# Patient Record
Sex: MALE | Race: WHITE | NOT HISPANIC OR LATINO | Employment: FULL TIME | URBAN - METROPOLITAN AREA
[De-identification: names, ages, dates, MRNs, and addresses within clinical notes are randomized per-mention and may not be internally consistent; named-entity substitution may affect disease eponyms.]

---

## 2017-03-08 ENCOUNTER — OFFICE VISIT (OUTPATIENT)
Dept: URGENT CARE | Facility: PHYSICIAN GROUP | Age: 47
End: 2017-03-08
Payer: COMMERCIAL

## 2017-03-08 VITALS
OXYGEN SATURATION: 97 % | HEART RATE: 70 BPM | DIASTOLIC BLOOD PRESSURE: 88 MMHG | WEIGHT: 192.2 LBS | RESPIRATION RATE: 16 BRPM | TEMPERATURE: 98.2 F | SYSTOLIC BLOOD PRESSURE: 120 MMHG

## 2017-03-08 DIAGNOSIS — J01.40 ACUTE NON-RECURRENT PANSINUSITIS: ICD-10-CM

## 2017-03-08 PROCEDURE — 99203 OFFICE O/P NEW LOW 30 MIN: CPT | Performed by: FAMILY MEDICINE

## 2017-03-08 RX ORDER — FLUTICASONE PROPIONATE 50 MCG
1 SPRAY, SUSPENSION (ML) NASAL DAILY
Qty: 1 BOTTLE | Refills: 1 | Status: SHIPPED | OUTPATIENT
Start: 2017-03-08 | End: 2019-09-05

## 2017-03-08 RX ORDER — AMOXICILLIN AND CLAVULANATE POTASSIUM 875; 125 MG/1; MG/1
1 TABLET, FILM COATED ORAL 2 TIMES DAILY
Qty: 20 TAB | Refills: 0 | Status: SHIPPED | OUTPATIENT
Start: 2017-03-08 | End: 2017-03-18

## 2017-03-08 ASSESSMENT — ENCOUNTER SYMPTOMS
DOUBLE VISION: 0
SINUS PRESSURE: 1
VOMITING: 0
NAUSEA: 0
DIZZINESS: 0
COUGH: 1
SHORTNESS OF BREATH: 0
SORE THROAT: 0
FEVER: 0
NECK PAIN: 0
CHILLS: 1
BLURRED VISION: 0
HEADACHES: 1
HOARSE VOICE: 1

## 2017-03-08 NOTE — MR AVS SNAPSHOT
Darien Martinez   3/8/2017 10:15 AM   Office Visit   MRN: [id number]    Department:  [facility]   Dept Phone:  [phone]    Description:  Male : 1970   Provider:  Romeo Goodrich M.D.           Reason for Visit     Sinus Problem sinus pressure, head pressure, drainage, x 2 weeks      Allergies as of 3/8/2017     No Known Allergies      You were diagnosed with     Acute non-recurrent pansinusitis   [[id number]]         Vital Signs     Blood Pressure Pulse Temperature Respirations Weight Oxygen Saturation    120/88 mmHg 70 36.8 °C (98.2 °F) 16 87.181 kg (192 lb 3.2 oz) 97%    Smoking Status                   Never Smoker            Basic Information     Date Of Birth Sex Race Ethnicity Preferred Language    1970 Male White Non- English      Health Maintenance     Patient has no pending health maintenance at this time      Current Immunizations     No immunizations on file.      Below and/or attached are the medications your provider expects you to take. Review all of your home medications and newly ordered medications with your provider and/or pharmacist. Follow medication instructions as directed by your provider and/or pharmacist. Please keep your medication list with you and share with your provider. Update the information when medications are discontinued, doses are changed, or new medications (including over-the-counter products) are added; and carry medication information at all times in the event of emergency situations     Allergies:  No Known Allergies          Medications  Valid as of: 2017 - 10:52 AM    Generic Name Brand Name Tablet Size Instructions for use    Amoxicillin-Pot Clavulanate (Tab) AUGMENTIN 875-125 MG Take 1 Tab by mouth 2 times a day for 10 days.        Fluticasone Propionate (Suspension) FLONASE 50 MCG/ACT Spray 1 Spray in nose every day.        .                 Medicines prescribed today were sent to:     [id number] - [address] [address]  - [address] [facility].    [address] [place] [address]    Phone: [phone] Fax: [phone]    Open 24 Hours?: No      Medication refill instructions:       If your prescription bottle indicates you have medication refills left, it is not necessary to call your provider’s office. Please contact your pharmacy and they will refill your medication.    If your prescription bottle indicates you do not have any refills left, you may request refills at any time through one of the following ways: The online Lagiar system (except Urgent Care), by calling your provider’s office, or by asking your pharmacy to contact your provider’s office with a refill request. Medication refills are processed only during regular business hours and may not be available until the next business day. Your provider may request additional information or to have a follow-up visit with you prior to refilling your medication.   *Please Note: Medication refills are assigned a new Rx number when refilled electronically. Your pharmacy may indicate that no refills were authorized even though a new prescription for the same medication is available at the pharmacy. Please request the medicine by name with the pharmacy before contacting your provider for a refill.           Lagiar Access Code: [email or web address]  Expires: 4/7/2017 10:16 AM    Lagiar  A secure, online tool to manage your health information     [employer]’s Lagiar® is a secure, online tool that connects you to your personalized health information from the privacy of your home -- day or night - making it very easy for you to manage your healthcare. Once the activation process is completed, you can even access your medical information using the Lagiar alejandro, which is available for free in the Apple Alejandro store or Google Play store.     Lagiar provides the following levels of access (as shown below):   My Chart Features   [facility] Primary Care Doctor [facility]  Specialists  [facility]  Urgent  Care [facility]  Primary Care  Doctor   Email your healthcare team securely and privately 24/7 X X X    Manage appointments: schedule your next appointment; view details of past/upcoming appointments X      Request prescription refills. X      View recent personal medical records, including lab and immunizations X X X X   View health record, including health history, allergies, medications X X X X   Read reports about your outpatient visits, procedures, consult and ER notes X X X X   See your discharge summary, which is a recap of your hospital and/or ER visit that includes your diagnosis, lab results, and care plan. X X       How to register for OdinOtvet:  1. Go to  [email or web address].  2. Click on the Sign Up Now box, which takes you to the New Member Sign Up page. You will need to provide the following information:  a. Enter your OdinOtvet Access Code exactly as it appears at the top of this page. (You will not need to use this code after you’ve completed the sign-up process. If you do not sign up before the expiration date, you must request a new code.)   b. Enter your date of birth.   c. Enter your home email address.   d. Click Submit, and follow the next screen’s instructions.  3. Create a OdinOtvet ID. This will be your OdinOtvet login ID and cannot be changed, so think of one that is secure and easy to remember.  4. Create a OdinOtvet password. You can change your password at any time.  5. Enter your Password Reset Question and Answer. This can be used at a later time if you forget your password.   6. Enter your e-mail address. This allows you to receive e-mail notifications when new information is available in OdinOtvet.  7. Click Sign Up. You can now view your health information.    For assistance activating your OdinOtvet account, call [phone]

## 2017-03-08 NOTE — PROGRESS NOTES
Subjective:      Darien Martinez is a 46 y.o. male who presents with Sinus Problem            Sinus Problem  This is a new problem. The current episode started 1 to 4 weeks ago (14 days). The problem has been gradually worsening since onset. There has been no fever. His pain is at a severity of 4/10. The pain is moderate. Associated symptoms include chills, congestion, coughing, headaches, a hoarse voice and sinus pressure. Pertinent negatives include no ear pain, neck pain, shortness of breath, sneezing or sore throat. Past treatments include oral decongestants, saline sprays and spray decongestants. The treatment provided mild relief.       Review of Systems   Constitutional: Positive for chills. Negative for fever.   HENT: Positive for congestion, hoarse voice and sinus pressure. Negative for ear pain, sneezing and sore throat.    Eyes: Negative for blurred vision and double vision.   Respiratory: Positive for cough. Negative for shortness of breath.    Cardiovascular: Negative for chest pain.   Gastrointestinal: Negative for nausea and vomiting.   Musculoskeletal: Negative for neck pain.   Neurological: Positive for headaches. Negative for dizziness.     PMH:  has no past medical history on file.  MEDS: No current outpatient prescriptions on file.  ALLERGIES: No Known Allergies  SURGHX:   Past Surgical History   Procedure Laterality Date   • Tympanoplasty       SOCHX:  reports that he has never smoked. His smokeless tobacco use includes Chew. He reports that he drinks alcohol. He reports that he does not use illicit drugs.  FH: Family history was reviewed, no pertinent findings to report       Objective:     /88 mmHg  Pulse 70  Temp(Src) 36.8 °C (98.2 °F)  Resp 16  Wt 87.181 kg (192 lb 3.2 oz)  SpO2 97%     Physical Exam   Constitutional: He appears well-developed.   HENT:   Head: Normocephalic.   Right Ear: External ear normal.   Left Ear: External ear normal.   Mouth/Throat: Oropharyngeal  exudate present.   Nasal congestion    Eyes: Right eye exhibits no discharge. Left eye exhibits no discharge.   Neck: Normal range of motion. No tracheal deviation present. No thyromegaly present.   Cardiovascular: Normal rate.  Exam reveals no friction rub.    No murmur heard.  Pulmonary/Chest: Effort normal. No stridor. No respiratory distress. He has no wheezes.   Abdominal: Soft. He exhibits no distension. There is no tenderness. There is no guarding.   Lymphadenopathy:     He has no cervical adenopathy.   Neurological: He is alert.   Skin: Skin is warm and dry. He is not diaphoretic.   Psychiatric: He has a normal mood and affect. His behavior is normal.   POSITIVE sinus tenderness            Assessment/Plan:     1. Acute non-recurrent pansinusitis  fluticasone (GNP FLUTICASONE PROPIONATE) 50 MCG/ACT nasal spray    amoxicillin-clavulanate (AUGMENTIN) 875-125 MG Tab     Supportive care  Push fluids  Monitor temperature  Follow-up if symptoms worsen or fail to improve    Patient was given a contingent antibiotic prescription to fill and use as directed if symptoms progressed as discussed and agreed upon.  Try flonase for 2-3 days before starting abx

## 2019-08-14 ENCOUNTER — TELEPHONE (OUTPATIENT)
Dept: SCHEDULING | Facility: IMAGING CENTER | Age: 49
End: 2019-08-14

## 2019-09-03 ENCOUNTER — APPOINTMENT (OUTPATIENT)
Dept: MEDICAL GROUP | Facility: MEDICAL CENTER | Age: 49
End: 2019-09-03
Payer: COMMERCIAL

## 2019-09-05 ENCOUNTER — OFFICE VISIT (OUTPATIENT)
Dept: MEDICAL GROUP | Facility: MEDICAL CENTER | Age: 49
End: 2019-09-05
Payer: COMMERCIAL

## 2019-09-05 VITALS
HEIGHT: 68 IN | DIASTOLIC BLOOD PRESSURE: 80 MMHG | TEMPERATURE: 97.7 F | WEIGHT: 193 LBS | HEART RATE: 64 BPM | SYSTOLIC BLOOD PRESSURE: 104 MMHG | RESPIRATION RATE: 16 BRPM | BODY MASS INDEX: 29.25 KG/M2 | OXYGEN SATURATION: 95 %

## 2019-09-05 DIAGNOSIS — E78.00 PURE HYPERCHOLESTEROLEMIA: ICD-10-CM

## 2019-09-05 DIAGNOSIS — Z13.29 THYROID DISORDER SCREEN: ICD-10-CM

## 2019-09-05 DIAGNOSIS — K42.9 UMBILICAL HERNIA WITHOUT OBSTRUCTION AND WITHOUT GANGRENE: ICD-10-CM

## 2019-09-05 DIAGNOSIS — R68.82 DECREASED LIBIDO: ICD-10-CM

## 2019-09-05 DIAGNOSIS — M25.511 CHRONIC RIGHT SHOULDER PAIN: ICD-10-CM

## 2019-09-05 DIAGNOSIS — G89.29 CHRONIC RIGHT SHOULDER PAIN: ICD-10-CM

## 2019-09-05 DIAGNOSIS — Z13.0 SCREENING FOR DEFICIENCY ANEMIA: ICD-10-CM

## 2019-09-05 DIAGNOSIS — M51.36 DDD (DEGENERATIVE DISC DISEASE), LUMBAR: ICD-10-CM

## 2019-09-05 PROBLEM — M51.369 DDD (DEGENERATIVE DISC DISEASE), LUMBAR: Status: ACTIVE | Noted: 2019-09-05

## 2019-09-05 PROCEDURE — 99214 OFFICE O/P EST MOD 30 MIN: CPT | Performed by: NURSE PRACTITIONER

## 2019-09-05 RX ORDER — NAPROXEN 500 MG/1
TABLET ORAL
Qty: 60 TAB | Refills: 0 | Status: SHIPPED | OUTPATIENT
Start: 2019-09-05 | End: 2021-01-28

## 2019-09-05 ASSESSMENT — PATIENT HEALTH QUESTIONNAIRE - PHQ9: CLINICAL INTERPRETATION OF PHQ2 SCORE: 0

## 2019-09-05 NOTE — ASSESSMENT & PLAN NOTE
Mildly elevated cholesterol in the past, was encouraged to work on diet and exercise. Weight tends to fluctuate from 175-190's  MGF had massive MI at 34. Mother with hyperlipidemia but no CAD.  He has never been prescribed statin, has not had cardiac calcium scoring

## 2019-09-05 NOTE — ASSESSMENT & PLAN NOTE
Decreased interest and decline in sexual performance- feeling that he does not have same quality of erection as in the past.  He is concerned about testosterone levels and requesting labs.  No muscle atrophy.  He does have fatigue but this may be related to work schedule.  He does not wake up with an erection

## 2019-09-07 PROBLEM — K42.9 UMBILICAL HERNIA: Status: ACTIVE | Noted: 2019-09-07

## 2019-09-07 NOTE — PROGRESS NOTES
Chief Complaint   Patient presents with   • Establish Care     LAB WORK, TESTOSTERONE      Darien Martinez is a 49 y.o. male here to establish care.  We discussed:  Pure hypercholesterolemia  Mildly elevated cholesterol in the past, was encouraged to work on diet and exercise. Weight tends to fluctuate from 175-190's  MGF had massive MI at 34. Mother with hyperlipidemia but no CAD.  He has never been prescribed statin, has not had cardiac calcium scoring    Decreased libido  Decreased interest and decline in sexual performance- feeling that he does not have same quality of erection as in the past.  He is concerned about testosterone levels and requesting labs.  No muscle atrophy.  He does have fatigue but this may be related to work schedule.  He does not wake up with an erection    DDD (degenerative disc disease), lumbar  Diagnosed with prior imaging, stable at this time and not requiring treatment    Chronic right shoulder pain  Ongoing for several months with no specific history of injury.  Shoulder cracks, pops, he has pain with overhead movement.  Is been seeing a chiropractor, this is been somewhat helpful but pain is not resolving.  No numbness or tingling in the arm, no change in strength, no joint swelling or erythema.  Not using any medication for this.  No prior imaging    Umbilical hernia  Protrusion of the umbilicus, nonpainful, has been present for quite some time    Current medicines (including changes today)  Current Outpatient Medications   Medication Sig Dispense Refill   • naproxen (NAPROSYN) 500 MG Tab 1 tab PO twice daily with food x 1 week then 1 tab PO twice daily PRN 60 Tab 0     No current facility-administered medications for this visit.      He  has no past medical history of Asthma, Diabetes (HCC), or Hypertension.  He  has a past surgical history that includes tympanoplasty and tympanotomy.  Social History     Tobacco Use   • Smoking status: Never Smoker   • Smokeless tobacco:  "Current User     Types: Chew   Substance Use Topics   • Alcohol use: Yes     Comment: occasional 6-8 drinks on the weekends   • Drug use: No     Social History     Social History Narrative   • Not on file     Family History   Problem Relation Age of Onset   • Hyperlipidemia Mother    • No Known Problems Father    • Hypertension Brother    • Heart Disease Maternal Grandfather 34        MI     Family Status   Relation Name Status   • Mo  Alive   • Fa  Alive   • Bro half Alive   • MGFa           ROS  Problems listed discussed above, all other systems reviewed and negative      /80 (BP Location: Left arm, Patient Position: Sitting, BP Cuff Size: Adult)   Pulse 64   Temp 36.5 °C (97.7 °F) (Temporal)   Resp 16   Ht 1.727 m (5' 8\")   Wt 87.5 kg (193 lb)   SpO2 95%  Body mass index is 29.35 kg/m².  Physical Exam:  General: Alert, oriented in no acute distress.  Eye contact is good, speech is normal, affect calm  HEENT: Oral mucosa pink moist, no lesions. Nares patent. TMs gray with good landmarks bilaterally. No cervical or supraclavicular lymphadenopathy, thyroid isthmus palpable without masses or nodules.  Lungs: clear to auscultation bilaterally, good aeration, normal effort. No wheeze/ rhonchi/ rales.  CV: regular rate and rhythm, S1, S2. No murmur, no JVD, no edema. Pedal pulses 2 + bilaterally  Abdomen: soft, nontender, BS x4, small umbilical hernia, easily reduced  Ext: color normal, vascularity normal, temperature normal. No rash or lesions.  MS: no point tenderness over the anterior posterior right shoulder, no swelling or deformity.  Full range of motion, audible cracking.  Positive empty can, positive impingement, negative drop arm  Neuro: DTR 2+ bilaterally  Assessment and Plan:   The following treatment plan was discussed   1. Pure hypercholesterolemia   evaluate labs, would consider cardiac calcium scoring of LDL remains elevated  Lipid Profile    Comp Metabolic Panel   2. Chronic right " shoulder pain   chronic shoulder pain with no particular history of injury.  We will try a course of NSAID, risks and benefits reviewed, advised to take medication with food.  Refer for physical therapy.  If no improvement may need to proceed with MRI  naproxen (NAPROSYN) 500 MG Tab    REFERRAL TO PHYSICAL THERAPY Reason for Therapy: Eval/Treat/Report   3. Decreased libido   decreased interest in sexual activity and complaints of erectile dysfunction.  Evaluate labs TESTOSTERONE SERUM   4. DDD (degenerative disc disease), lumbar   stable and not requiring treatment   5. Thyroid disorder screen  TSH WITH REFLEX TO FT4   6. Screening for deficiency anemia  CBC WITH DIFFERENTIAL   7. Umbilical hernia without obstruction and without gangrene   small umbilical hernia, nonpainful.  Monitor for now       Educated in proper administration of medication(s) ordered today including safety, possible SE, risks, benefits, rationale and alternatives to therapy.     Followup: Pending labs             Please note that this dictation was created using voice recognition software. I have worked with consultants from the vendor as well as technical experts from [facility] [employer] to optimize the interface. I have made every reasonable attempt to correct obvious errors, but I expect that there are errors of grammar and possibly content that I did not discover before finalizing the note.

## 2019-09-07 NOTE — ASSESSMENT & PLAN NOTE
Ongoing for several months with no specific history of injury.  Shoulder cracks, pops, he has pain with overhead movement.  Is been seeing a chiropractor, this is been somewhat helpful but pain is not resolving.  No numbness or tingling in the arm, no change in strength, no joint swelling or erythema.  Not using any medication for this.  No prior imaging

## 2021-01-27 SDOH — HEALTH STABILITY: PHYSICAL HEALTH: ON AVERAGE, HOW MANY DAYS PER WEEK DO YOU ENGAGE IN MODERATE TO STRENUOUS EXERCISE (LIKE A BRISK WALK)?: 1 DAY

## 2021-01-27 SDOH — ECONOMIC STABILITY: HOUSING INSECURITY
IN THE LAST 12 MONTHS, WAS THERE A TIME WHEN YOU DID NOT HAVE A STEADY PLACE TO SLEEP OR SLEPT IN A SHELTER (INCLUDING NOW)?: NO

## 2021-01-27 SDOH — ECONOMIC STABILITY: INCOME INSECURITY: IN THE LAST 12 MONTHS, WAS THERE A TIME WHEN YOU WERE NOT ABLE TO PAY THE MORTGAGE OR RENT ON TIME?: NO

## 2021-01-27 SDOH — HEALTH STABILITY: PHYSICAL HEALTH: ON AVERAGE, HOW MANY MINUTES DO YOU ENGAGE IN EXERCISE AT THIS LEVEL?: 30 MINUTES

## 2021-01-27 SDOH — HEALTH STABILITY: MENTAL HEALTH
STRESS IS WHEN SOMEONE FEELS TENSE, NERVOUS, ANXIOUS, OR CAN'T SLEEP AT NIGHT BECAUSE THEIR MIND IS TROUBLED. HOW STRESSED ARE YOU?: NOT AT ALL

## 2021-01-27 SDOH — ECONOMIC STABILITY: HOUSING INSECURITY: IN THE LAST 12 MONTHS, HOW MANY PLACES HAVE YOU LIVED?: 1

## 2021-01-27 SDOH — ECONOMIC STABILITY: TRANSPORTATION INSECURITY
IN THE PAST 12 MONTHS, HAS LACK OF RELIABLE TRANSPORTATION KEPT YOU FROM MEDICAL APPOINTMENTS, MEETINGS, WORK OR FROM GETTING THINGS NEEDED FOR DAILY LIVING?: NO

## 2021-01-27 SDOH — ECONOMIC STABILITY: TRANSPORTATION INSECURITY
IN THE PAST 12 MONTHS, HAS THE LACK OF TRANSPORTATION KEPT YOU FROM MEDICAL APPOINTMENTS OR FROM GETTING MEDICATIONS?: NO

## 2021-01-27 ASSESSMENT — SOCIAL DETERMINANTS OF HEALTH (SDOH)
HOW OFTEN DO YOU GET TOGETHER WITH FRIENDS OR RELATIVES?: TWICE A WEEK
HOW OFTEN DO YOU ATTENT MEETINGS OF THE CLUB OR ORGANIZATION YOU BELONG TO?: NEVER
WITHIN THE PAST 12 MONTHS, THE FOOD YOU BOUGHT JUST DIDN'T LAST AND YOU DIDN'T HAVE MONEY TO GET MORE: NEVER TRUE
DO YOU BELONG TO ANY CLUBS OR ORGANIZATIONS SUCH AS CHURCH GROUPS UNIONS, FRATERNAL OR ATHLETIC GROUPS, OR SCHOOL GROUPS?: YES
IN A TYPICAL WEEK, HOW MANY TIMES DO YOU TALK ON THE PHONE WITH FAMILY, FRIENDS, OR NEIGHBORS?: ONCE A WEEK
HOW OFTEN DO YOU ATTEND CHURCH OR RELIGIOUS SERVICES?: NEVER
HOW HARD IS IT FOR YOU TO PAY FOR THE VERY BASICS LIKE FOOD, HOUSING, MEDICAL CARE, AND HEATING?: NOT HARD AT ALL
HOW OFTEN DO YOU HAVE A DRINK CONTAINING ALCOHOL: 2-4 TIMES A MONTH
HOW OFTEN DO YOU HAVE SIX OR MORE DRINKS ON ONE OCCASION: LESS THAN MONTHLY
HOW MANY DRINKS CONTAINING ALCOHOL DO YOU HAVE ON A TYPICAL DAY WHEN YOU ARE DRINKING: 1 OR 2
WITHIN THE PAST 12 MONTHS, YOU WORRIED THAT YOUR FOOD WOULD RUN OUT BEFORE YOU GOT THE MONEY TO BUY MORE: NEVER TRUE

## 2021-01-28 ENCOUNTER — HOSPITAL ENCOUNTER (OUTPATIENT)
Dept: LAB | Facility: MEDICAL CENTER | Age: 51
End: 2021-01-28
Attending: NURSE PRACTITIONER
Payer: COMMERCIAL

## 2021-01-28 ENCOUNTER — OFFICE VISIT (OUTPATIENT)
Dept: MEDICAL GROUP | Facility: PHYSICIAN GROUP | Age: 51
End: 2021-01-28
Payer: COMMERCIAL

## 2021-01-28 VITALS
SYSTOLIC BLOOD PRESSURE: 116 MMHG | TEMPERATURE: 98.6 F | WEIGHT: 203 LBS | HEART RATE: 83 BPM | HEIGHT: 68 IN | BODY MASS INDEX: 30.77 KG/M2 | OXYGEN SATURATION: 99 % | DIASTOLIC BLOOD PRESSURE: 84 MMHG

## 2021-01-28 DIAGNOSIS — E78.00 PURE HYPERCHOLESTEROLEMIA: ICD-10-CM

## 2021-01-28 DIAGNOSIS — R68.82 DECREASED LIBIDO: ICD-10-CM

## 2021-01-28 DIAGNOSIS — Z23 NEED FOR VACCINATION: ICD-10-CM

## 2021-01-28 DIAGNOSIS — Z72.0 CHEWING TOBACCO USE: ICD-10-CM

## 2021-01-28 DIAGNOSIS — M51.36 DDD (DEGENERATIVE DISC DISEASE), LUMBAR: ICD-10-CM

## 2021-01-28 DIAGNOSIS — Z12.11 SCREEN FOR COLON CANCER: ICD-10-CM

## 2021-01-28 LAB
25(OH)D3 SERPL-MCNC: 41 NG/ML (ref 30–100)
ALBUMIN SERPL BCP-MCNC: 4.9 G/DL (ref 3.2–4.9)
ALBUMIN/GLOB SERPL: 2 G/DL
ALP SERPL-CCNC: 51 U/L (ref 30–99)
ALT SERPL-CCNC: 43 U/L (ref 2–50)
ANION GAP SERPL CALC-SCNC: 10 MMOL/L (ref 7–16)
AST SERPL-CCNC: 31 U/L (ref 12–45)
BASOPHILS # BLD AUTO: 1.5 % (ref 0–1.8)
BASOPHILS # BLD: 0.08 K/UL (ref 0–0.12)
BILIRUB SERPL-MCNC: 0.6 MG/DL (ref 0.1–1.5)
BUN SERPL-MCNC: 18 MG/DL (ref 8–22)
CALCIUM SERPL-MCNC: 9.8 MG/DL (ref 8.5–10.5)
CHLORIDE SERPL-SCNC: 104 MMOL/L (ref 96–112)
CHOLEST SERPL-MCNC: 266 MG/DL (ref 100–199)
CO2 SERPL-SCNC: 23 MMOL/L (ref 20–33)
CREAT SERPL-MCNC: 0.94 MG/DL (ref 0.5–1.4)
EOSINOPHIL # BLD AUTO: 0.27 K/UL (ref 0–0.51)
EOSINOPHIL NFR BLD: 5.1 % (ref 0–6.9)
ERYTHROCYTE [DISTWIDTH] IN BLOOD BY AUTOMATED COUNT: 44.9 FL (ref 35.9–50)
FASTING STATUS PATIENT QL REPORTED: NORMAL
GLOBULIN SER CALC-MCNC: 2.4 G/DL (ref 1.9–3.5)
GLUCOSE SERPL-MCNC: 83 MG/DL (ref 65–99)
HCT VFR BLD AUTO: 46.8 % (ref 42–52)
HDLC SERPL-MCNC: 37 MG/DL
HGB BLD-MCNC: 16.1 G/DL (ref 14–18)
IMM GRANULOCYTES # BLD AUTO: 0.02 K/UL (ref 0–0.11)
IMM GRANULOCYTES NFR BLD AUTO: 0.4 % (ref 0–0.9)
LDLC SERPL CALC-MCNC: 169 MG/DL
LYMPHOCYTES # BLD AUTO: 1.77 K/UL (ref 1–4.8)
LYMPHOCYTES NFR BLD: 33.3 % (ref 22–41)
MCH RBC QN AUTO: 31.7 PG (ref 27–33)
MCHC RBC AUTO-ENTMCNC: 34.4 G/DL (ref 33.7–35.3)
MCV RBC AUTO: 92.1 FL (ref 81.4–97.8)
MONOCYTES # BLD AUTO: 0.49 K/UL (ref 0–0.85)
MONOCYTES NFR BLD AUTO: 9.2 % (ref 0–13.4)
NEUTROPHILS # BLD AUTO: 2.69 K/UL (ref 1.82–7.42)
NEUTROPHILS NFR BLD: 50.5 % (ref 44–72)
NRBC # BLD AUTO: 0 K/UL
NRBC BLD-RTO: 0 /100 WBC
PLATELET # BLD AUTO: 191 K/UL (ref 164–446)
PMV BLD AUTO: 11.6 FL (ref 9–12.9)
POTASSIUM SERPL-SCNC: 4.4 MMOL/L (ref 3.6–5.5)
PROT SERPL-MCNC: 7.3 G/DL (ref 6–8.2)
RBC # BLD AUTO: 5.08 M/UL (ref 4.7–6.1)
SODIUM SERPL-SCNC: 137 MMOL/L (ref 135–145)
TESTOST SERPL-MCNC: 248 NG/DL (ref 175–781)
TRIGL SERPL-MCNC: 298 MG/DL (ref 0–149)
WBC # BLD AUTO: 5.3 K/UL (ref 4.8–10.8)

## 2021-01-28 PROCEDURE — 82306 VITAMIN D 25 HYDROXY: CPT

## 2021-01-28 PROCEDURE — 99214 OFFICE O/P EST MOD 30 MIN: CPT | Mod: 25 | Performed by: NURSE PRACTITIONER

## 2021-01-28 PROCEDURE — 85025 COMPLETE CBC W/AUTO DIFF WBC: CPT

## 2021-01-28 PROCEDURE — 90750 HZV VACC RECOMBINANT IM: CPT | Performed by: NURSE PRACTITIONER

## 2021-01-28 PROCEDURE — 36415 COLL VENOUS BLD VENIPUNCTURE: CPT

## 2021-01-28 PROCEDURE — 90471 IMMUNIZATION ADMIN: CPT | Performed by: NURSE PRACTITIONER

## 2021-01-28 PROCEDURE — 84403 ASSAY OF TOTAL TESTOSTERONE: CPT

## 2021-01-28 PROCEDURE — 80053 COMPREHEN METABOLIC PANEL: CPT

## 2021-01-28 PROCEDURE — 80061 LIPID PANEL: CPT

## 2021-01-28 SDOH — HEALTH STABILITY: MENTAL HEALTH: HOW OFTEN DO YOU HAVE A DRINK CONTAINING ALCOHOL?: 2-3 TIMES A WEEK

## 2021-01-28 SDOH — HEALTH STABILITY: MENTAL HEALTH: HOW OFTEN DO YOU HAVE 6 OR MORE DRINKS ON ONE OCCASION?: WEEKLY

## 2021-01-28 ASSESSMENT — PATIENT HEALTH QUESTIONNAIRE - PHQ9: CLINICAL INTERPRETATION OF PHQ2 SCORE: 0

## 2021-01-28 NOTE — ASSESSMENT & PLAN NOTE
Chronic issues.  Degenerative disc disease.  Is trying to stengthen core.  Takes occasional advil.  Stretching, roller ball.

## 2021-01-28 NOTE — ASSESSMENT & PLAN NOTE
New problem.  Due for labs.  Has not been on medication in the past.  Working on weight loss: keto.  Has gained some weight this past 2 months.

## 2021-01-28 NOTE — ASSESSMENT & PLAN NOTE
New problem to me.  Chronic, user for 30 years.  Would like to quit.  Sees dentist yearly.  No sores in mouth, no swelling in neck or throat reported.

## 2021-01-28 NOTE — ASSESSMENT & PLAN NOTE
Patient is requesting updated testosterone level.  Continues to have decline in sexual performance.

## 2022-08-06 ENCOUNTER — OFFICE VISIT (OUTPATIENT)
Dept: URGENT CARE | Facility: PHYSICIAN GROUP | Age: 52
End: 2022-08-06
Payer: COMMERCIAL

## 2022-08-06 VITALS
RESPIRATION RATE: 16 BRPM | SYSTOLIC BLOOD PRESSURE: 116 MMHG | TEMPERATURE: 98 F | HEART RATE: 63 BPM | WEIGHT: 192 LBS | HEIGHT: 68 IN | OXYGEN SATURATION: 95 % | BODY MASS INDEX: 29.1 KG/M2 | DIASTOLIC BLOOD PRESSURE: 82 MMHG

## 2022-08-06 DIAGNOSIS — H02.823: ICD-10-CM

## 2022-08-06 PROCEDURE — 99213 OFFICE O/P EST LOW 20 MIN: CPT | Performed by: EMERGENCY MEDICINE

## 2022-08-06 RX ORDER — DOXYCYCLINE HYCLATE 100 MG
100 TABLET ORAL 2 TIMES DAILY
Qty: 10 TABLET | Refills: 0 | Status: SHIPPED | OUTPATIENT
Start: 2022-08-06 | End: 2022-08-11

## 2022-08-06 ASSESSMENT — ENCOUNTER SYMPTOMS
EYE REDNESS: 1
EYE PAIN: 0
FOREIGN BODY SENSATION: 0
BLURRED VISION: 0
EYE DISCHARGE: 0
EYE ITCHING: 0

## 2022-08-06 ASSESSMENT — FIBROSIS 4 INDEX: FIB4 SCORE: 1.29

## 2023-05-10 SDOH — ECONOMIC STABILITY: FOOD INSECURITY: WITHIN THE PAST 12 MONTHS, YOU WORRIED THAT YOUR FOOD WOULD RUN OUT BEFORE YOU GOT MONEY TO BUY MORE.: NEVER TRUE

## 2023-05-10 SDOH — ECONOMIC STABILITY: FOOD INSECURITY: WITHIN THE PAST 12 MONTHS, THE FOOD YOU BOUGHT JUST DIDN'T LAST AND YOU DIDN'T HAVE MONEY TO GET MORE.: NEVER TRUE

## 2023-05-10 SDOH — ECONOMIC STABILITY: INCOME INSECURITY: IN THE LAST 12 MONTHS, WAS THERE A TIME WHEN YOU WERE NOT ABLE TO PAY THE MORTGAGE OR RENT ON TIME?: NO

## 2023-05-10 SDOH — HEALTH STABILITY: PHYSICAL HEALTH: ON AVERAGE, HOW MANY MINUTES DO YOU ENGAGE IN EXERCISE AT THIS LEVEL?: 10 MIN

## 2023-05-10 SDOH — ECONOMIC STABILITY: TRANSPORTATION INSECURITY
IN THE PAST 12 MONTHS, HAS LACK OF TRANSPORTATION KEPT YOU FROM MEETINGS, WORK, OR FROM GETTING THINGS NEEDED FOR DAILY LIVING?: NO

## 2023-05-10 SDOH — ECONOMIC STABILITY: HOUSING INSECURITY: IN THE LAST 12 MONTHS, HOW MANY PLACES HAVE YOU LIVED?: 1

## 2023-05-10 SDOH — ECONOMIC STABILITY: INCOME INSECURITY: HOW HARD IS IT FOR YOU TO PAY FOR THE VERY BASICS LIKE FOOD, HOUSING, MEDICAL CARE, AND HEATING?: NOT HARD AT ALL

## 2023-05-10 SDOH — HEALTH STABILITY: PHYSICAL HEALTH: ON AVERAGE, HOW MANY DAYS PER WEEK DO YOU ENGAGE IN MODERATE TO STRENUOUS EXERCISE (LIKE A BRISK WALK)?: 1 DAY

## 2023-05-10 ASSESSMENT — SOCIAL DETERMINANTS OF HEALTH (SDOH)
IN A TYPICAL WEEK, HOW MANY TIMES DO YOU TALK ON THE PHONE WITH FAMILY, FRIENDS, OR NEIGHBORS?: TWICE A WEEK
HOW HARD IS IT FOR YOU TO PAY FOR THE VERY BASICS LIKE FOOD, HOUSING, MEDICAL CARE, AND HEATING?: NOT HARD AT ALL
HOW OFTEN DO YOU GET TOGETHER WITH FRIENDS OR RELATIVES?: ONCE A WEEK
HOW OFTEN DO YOU ATTENT MEETINGS OF THE CLUB OR ORGANIZATION YOU BELONG TO?: NEVER
WITHIN THE PAST 12 MONTHS, YOU WORRIED THAT YOUR FOOD WOULD RUN OUT BEFORE YOU GOT THE MONEY TO BUY MORE: NEVER TRUE
HOW OFTEN DO YOU HAVE SIX OR MORE DRINKS ON ONE OCCASION: LESS THAN MONTHLY
DO YOU BELONG TO ANY CLUBS OR ORGANIZATIONS SUCH AS CHURCH GROUPS UNIONS, FRATERNAL OR ATHLETIC GROUPS, OR SCHOOL GROUPS?: NO
DO YOU BELONG TO ANY CLUBS OR ORGANIZATIONS SUCH AS CHURCH GROUPS UNIONS, FRATERNAL OR ATHLETIC GROUPS, OR SCHOOL GROUPS?: NO
HOW OFTEN DO YOU ATTEND CHURCH OR RELIGIOUS SERVICES?: NEVER
HOW OFTEN DO YOU GET TOGETHER WITH FRIENDS OR RELATIVES?: ONCE A WEEK
HOW MANY DRINKS CONTAINING ALCOHOL DO YOU HAVE ON A TYPICAL DAY WHEN YOU ARE DRINKING: 5 OR 6
IN A TYPICAL WEEK, HOW MANY TIMES DO YOU TALK ON THE PHONE WITH FAMILY, FRIENDS, OR NEIGHBORS?: TWICE A WEEK
HOW OFTEN DO YOU HAVE A DRINK CONTAINING ALCOHOL: MONTHLY OR LESS
HOW OFTEN DO YOU ATTEND CHURCH OR RELIGIOUS SERVICES?: NEVER
HOW OFTEN DO YOU ATTENT MEETINGS OF THE CLUB OR ORGANIZATION YOU BELONG TO?: NEVER

## 2023-05-10 ASSESSMENT — LIFESTYLE VARIABLES
SKIP TO QUESTIONS 9-10: 0
HOW OFTEN DO YOU HAVE A DRINK CONTAINING ALCOHOL: MONTHLY OR LESS
HOW OFTEN DO YOU HAVE SIX OR MORE DRINKS ON ONE OCCASION: LESS THAN MONTHLY
AUDIT-C TOTAL SCORE: 4
HOW MANY STANDARD DRINKS CONTAINING ALCOHOL DO YOU HAVE ON A TYPICAL DAY: 5 OR 6

## 2023-05-16 ENCOUNTER — OFFICE VISIT (OUTPATIENT)
Dept: MEDICAL GROUP | Facility: LAB | Age: 53
End: 2023-05-16
Payer: COMMERCIAL

## 2023-05-16 VITALS
TEMPERATURE: 97.5 F | RESPIRATION RATE: 14 BRPM | BODY MASS INDEX: 29.13 KG/M2 | SYSTOLIC BLOOD PRESSURE: 102 MMHG | HEART RATE: 58 BPM | HEIGHT: 68 IN | OXYGEN SATURATION: 96 % | DIASTOLIC BLOOD PRESSURE: 66 MMHG | WEIGHT: 192.2 LBS

## 2023-05-16 DIAGNOSIS — E78.00 PURE HYPERCHOLESTEROLEMIA: ICD-10-CM

## 2023-05-16 DIAGNOSIS — Z12.5 PROSTATE CANCER SCREENING: ICD-10-CM

## 2023-05-16 DIAGNOSIS — M25.551 CHRONIC PAIN OF BOTH HIPS: ICD-10-CM

## 2023-05-16 DIAGNOSIS — Z00.00 PREVENTATIVE HEALTH CARE: ICD-10-CM

## 2023-05-16 DIAGNOSIS — G89.29 CHRONIC PAIN OF BOTH HIPS: ICD-10-CM

## 2023-05-16 DIAGNOSIS — M25.552 CHRONIC PAIN OF BOTH HIPS: ICD-10-CM

## 2023-05-16 PROBLEM — F41.9 ANXIETY: Status: ACTIVE | Noted: 2023-05-16

## 2023-05-16 PROCEDURE — 99214 OFFICE O/P EST MOD 30 MIN: CPT | Performed by: NURSE PRACTITIONER

## 2023-05-16 PROCEDURE — 3078F DIAST BP <80 MM HG: CPT | Performed by: NURSE PRACTITIONER

## 2023-05-16 PROCEDURE — 3074F SYST BP LT 130 MM HG: CPT | Performed by: NURSE PRACTITIONER

## 2023-05-16 ASSESSMENT — PATIENT HEALTH QUESTIONNAIRE - PHQ9: CLINICAL INTERPRETATION OF PHQ2 SCORE: 0

## 2023-05-16 NOTE — ASSESSMENT & PLAN NOTE
Chronic condition. Due for labs, no labs since 2021. Patient currently managing with lifestyle modifications. The 10-year ASCVD risk score (Shahla CASEY, et al., 2019) is: 6%.  Denies chest pain, shortness of breath, heart palpitations.    Lab Results   Component Value Date/Time    CHOLSTRLTOT 266 (H) 01/28/2021 01:27 PM     (H) 01/28/2021 01:27 PM    HDL 37 (A) 01/28/2021 01:27 PM    TRIGLYCERIDE 298 (H) 01/28/2021 01:27 PM

## 2023-05-16 NOTE — PROGRESS NOTES
"Subjective:     CC:    Chief Complaint   Patient presents with    Establish Care    Requesting Labs     Blood panel     Hip Problem     Concerns     Paperwork     FMLA        HISTORY OF THE PRESENT ILLNESS: Patient is a 53 y.o. male. This pleasant patient is here today to establish care and discuss the following:    Chronic pain of both hips  Ongoing for several years. Reports that he sits for long hours at his job and has noticed some increased anterior hip pain bilaterally. Denies injury, swelling, bruising, popping.     Pure hypercholesterolemia  Chronic condition. Due for labs, no labs since 2021. Patient currently managing with lifestyle modifications. The 10-year ASCVD risk score (Shahla CASEY, et al., 2019) is: 6%.  Denies chest pain, shortness of breath, heart palpitations.    Lab Results   Component Value Date/Time    CHOLSTRLTOT 266 (H) 01/28/2021 01:27 PM     (H) 01/28/2021 01:27 PM    HDL 37 (A) 01/28/2021 01:27 PM    TRIGLYCERIDE 298 (H) 01/28/2021 01:27 PM        ROS:   Gen: no fevers/chills, no changes in weight  Eyes: no changes in vision  ENT: no sore throat, no hearing loss, no bloody nose  Pulm: no sob, no cough  CV: no chest pain, no palpitations  GI: no nausea/vomiting, no diarrhea  : no dysuria  MSk: no myalgias  Skin: no rash  Neuro: no headaches, no numbness/tingling  Heme/Lymph: no easy bruising        - NOTE: All other systems reviewed and are negative, except as in HPI.      Objective:     Exam: /66 (BP Location: Right arm, Patient Position: Sitting, BP Cuff Size: Adult)   Pulse (!) 58   Temp 36.4 °C (97.5 °F)   Resp 14   Ht 1.727 m (5' 8\")   Wt 87.2 kg (192 lb 3.2 oz)   SpO2 96%  Body mass index is 29.22 kg/m².    Constitutional: Alert, no distress, well-groomed.  Skin: Warm, dry, good turgor, no rashes in visible areas.  Eye: Equal, round and reactive, conjunctiva clear, lids normal.  ENMT: Lips without lesions, good dentition, moist mucous membranes.  Neck: Trachea " midline, no masses, no thyromegaly.  Respiratory: Unlabored respiratory effort, no cough.  MSK: Normal gait, moves all extremities.  Neuro: Grossly non-focal.   Psych: Alert and oriented x3, normal affect and mood.    Assessment & Plan:   53 y.o. male with the following -    1. Chronic pain of both hips  Chronic condition. X-ray ordered. Discussed pain management options including joint protection, anti-inflammatories, non-offending activities. Consider PT or ortho referral.   - DX-HIP-BILATERAL-WITH PELVIS-2 VIEWS; Future    2. Pure hypercholesterolemia  Chronic condition.  Labs as indicated.  Continue lifestyle modifications.  - Comp Metabolic Panel; Future  - Lipid Profile; Future    3. Preventative health care  Labs ordered.   - CBC WITH DIFFERENTIAL; Future  - Comp Metabolic Panel; Future  - Lipid Profile; Future  - HEMOGLOBIN A1C; Future  - TSH WITH REFLEX TO FT4; Future  - VITAMIN D,25 HYDROXY (DEFICIENCY); Future    4. Prostate cancer screening  Labs ordered.   - PROSTATE SPECIFIC AG SCREENING; Future

## 2023-05-16 NOTE — ASSESSMENT & PLAN NOTE
Ongoing for several years. Reports that he sits for long hours at his job and has noticed some increased anterior hip pain bilaterally. Denies injury, swelling, bruising, popping.

## 2023-05-17 ENCOUNTER — HOSPITAL ENCOUNTER (OUTPATIENT)
Dept: LAB | Facility: MEDICAL CENTER | Age: 53
End: 2023-05-17
Attending: NURSE PRACTITIONER
Payer: COMMERCIAL

## 2023-05-17 DIAGNOSIS — Z00.00 PREVENTATIVE HEALTH CARE: ICD-10-CM

## 2023-05-17 DIAGNOSIS — Z12.5 PROSTATE CANCER SCREENING: ICD-10-CM

## 2023-05-17 DIAGNOSIS — E78.00 PURE HYPERCHOLESTEROLEMIA: ICD-10-CM

## 2023-05-17 LAB
BASOPHILS # BLD AUTO: 1.7 % (ref 0–1.8)
BASOPHILS # BLD: 0.07 K/UL (ref 0–0.12)
EOSINOPHIL # BLD AUTO: 0.35 K/UL (ref 0–0.51)
EOSINOPHIL NFR BLD: 8.7 % (ref 0–6.9)
ERYTHROCYTE [DISTWIDTH] IN BLOOD BY AUTOMATED COUNT: 46 FL (ref 35.9–50)
EST. AVERAGE GLUCOSE BLD GHB EST-MCNC: 108 MG/DL
HBA1C MFR BLD: 5.4 % (ref 4–5.6)
HCT VFR BLD AUTO: 49.9 % (ref 42–52)
HGB BLD-MCNC: 16.8 G/DL (ref 14–18)
IMM GRANULOCYTES # BLD AUTO: 0.01 K/UL (ref 0–0.11)
IMM GRANULOCYTES NFR BLD AUTO: 0.2 % (ref 0–0.9)
LYMPHOCYTES # BLD AUTO: 1.67 K/UL (ref 1–4.8)
LYMPHOCYTES NFR BLD: 41.5 % (ref 22–41)
MCH RBC QN AUTO: 31.6 PG (ref 27–33)
MCHC RBC AUTO-ENTMCNC: 33.7 G/DL (ref 33.7–35.3)
MCV RBC AUTO: 93.8 FL (ref 81.4–97.8)
MONOCYTES # BLD AUTO: 0.37 K/UL (ref 0–0.85)
MONOCYTES NFR BLD AUTO: 9.2 % (ref 0–13.4)
NEUTROPHILS # BLD AUTO: 1.55 K/UL (ref 1.82–7.42)
NEUTROPHILS NFR BLD: 38.7 % (ref 44–72)
NRBC # BLD AUTO: 0 K/UL
NRBC BLD-RTO: 0 /100 WBC
PLATELET # BLD AUTO: 206 K/UL (ref 164–446)
PMV BLD AUTO: 12 FL (ref 9–12.9)
RBC # BLD AUTO: 5.32 M/UL (ref 4.7–6.1)
WBC # BLD AUTO: 4 K/UL (ref 4.8–10.8)

## 2023-05-17 PROCEDURE — 83036 HEMOGLOBIN GLYCOSYLATED A1C: CPT

## 2023-05-17 PROCEDURE — 82306 VITAMIN D 25 HYDROXY: CPT

## 2023-05-17 PROCEDURE — 84153 ASSAY OF PSA TOTAL: CPT

## 2023-05-17 PROCEDURE — 80053 COMPREHEN METABOLIC PANEL: CPT

## 2023-05-17 PROCEDURE — 85025 COMPLETE CBC W/AUTO DIFF WBC: CPT

## 2023-05-17 PROCEDURE — 80061 LIPID PANEL: CPT

## 2023-05-17 PROCEDURE — 84443 ASSAY THYROID STIM HORMONE: CPT

## 2023-05-17 PROCEDURE — 36415 COLL VENOUS BLD VENIPUNCTURE: CPT

## 2023-05-18 LAB
25(OH)D3 SERPL-MCNC: 34 NG/ML (ref 30–100)
ALBUMIN SERPL BCP-MCNC: 4.8 G/DL (ref 3.2–4.9)
ALBUMIN/GLOB SERPL: 1.8 G/DL
ALP SERPL-CCNC: 53 U/L (ref 30–99)
ALT SERPL-CCNC: 27 U/L (ref 2–50)
ANION GAP SERPL CALC-SCNC: 13 MMOL/L (ref 7–16)
AST SERPL-CCNC: 19 U/L (ref 12–45)
BILIRUB SERPL-MCNC: 0.6 MG/DL (ref 0.1–1.5)
BUN SERPL-MCNC: 19 MG/DL (ref 8–22)
CALCIUM ALBUM COR SERPL-MCNC: 9 MG/DL (ref 8.5–10.5)
CALCIUM SERPL-MCNC: 9.6 MG/DL (ref 8.5–10.5)
CHLORIDE SERPL-SCNC: 103 MMOL/L (ref 96–112)
CHOLEST SERPL-MCNC: 269 MG/DL (ref 100–199)
CO2 SERPL-SCNC: 22 MMOL/L (ref 20–33)
CREAT SERPL-MCNC: 0.91 MG/DL (ref 0.5–1.4)
FASTING STATUS PATIENT QL REPORTED: NORMAL
GFR SERPLBLD CREATININE-BSD FMLA CKD-EPI: 101 ML/MIN/1.73 M 2
GLOBULIN SER CALC-MCNC: 2.6 G/DL (ref 1.9–3.5)
GLUCOSE SERPL-MCNC: 82 MG/DL (ref 65–99)
HDLC SERPL-MCNC: 40 MG/DL
LDLC SERPL CALC-MCNC: 191 MG/DL
POTASSIUM SERPL-SCNC: 4.4 MMOL/L (ref 3.6–5.5)
PROT SERPL-MCNC: 7.4 G/DL (ref 6–8.2)
PSA SERPL-MCNC: 0.59 NG/ML (ref 0–4)
SODIUM SERPL-SCNC: 138 MMOL/L (ref 135–145)
TRIGL SERPL-MCNC: 191 MG/DL (ref 0–149)
TSH SERPL DL<=0.005 MIU/L-ACNC: 2.34 UIU/ML (ref 0.38–5.33)

## 2023-05-19 DIAGNOSIS — R79.89 ABNORMAL CBC: ICD-10-CM

## 2023-08-18 ENCOUNTER — APPOINTMENT (OUTPATIENT)
Dept: RADIOLOGY | Facility: IMAGING CENTER | Age: 53
End: 2023-08-18
Attending: NURSE PRACTITIONER
Payer: COMMERCIAL

## 2023-08-18 DIAGNOSIS — G89.29 CHRONIC PAIN OF BOTH HIPS: ICD-10-CM

## 2023-08-18 DIAGNOSIS — M25.552 CHRONIC PAIN OF BOTH HIPS: ICD-10-CM

## 2023-08-18 DIAGNOSIS — M25.551 CHRONIC PAIN OF BOTH HIPS: ICD-10-CM

## 2023-08-18 PROCEDURE — 73522 X-RAY EXAM HIPS BI 3-4 VIEWS: CPT | Mod: TC | Performed by: PHYSICIAN ASSISTANT

## 2023-08-28 DIAGNOSIS — G89.29 CHRONIC PAIN OF BOTH HIPS: ICD-10-CM

## 2023-08-28 DIAGNOSIS — M25.551 CHRONIC PAIN OF BOTH HIPS: ICD-10-CM

## 2023-08-28 DIAGNOSIS — M25.552 CHRONIC PAIN OF BOTH HIPS: ICD-10-CM

## 2024-04-22 ENCOUNTER — OFFICE VISIT (OUTPATIENT)
Dept: URGENT CARE | Facility: PHYSICIAN GROUP | Age: 54
End: 2024-04-22
Payer: COMMERCIAL

## 2024-04-22 VITALS
BODY MASS INDEX: 29.1 KG/M2 | TEMPERATURE: 98.1 F | DIASTOLIC BLOOD PRESSURE: 64 MMHG | HEART RATE: 71 BPM | WEIGHT: 192 LBS | RESPIRATION RATE: 20 BRPM | HEIGHT: 68 IN | SYSTOLIC BLOOD PRESSURE: 118 MMHG | OXYGEN SATURATION: 98 %

## 2024-04-22 DIAGNOSIS — S39.012A STRAIN OF LUMBAR REGION, INITIAL ENCOUNTER: ICD-10-CM

## 2024-04-22 PROCEDURE — 3074F SYST BP LT 130 MM HG: CPT | Performed by: NURSE PRACTITIONER

## 2024-04-22 PROCEDURE — 99213 OFFICE O/P EST LOW 20 MIN: CPT | Performed by: NURSE PRACTITIONER

## 2024-04-22 PROCEDURE — 3078F DIAST BP <80 MM HG: CPT | Performed by: NURSE PRACTITIONER

## 2024-04-22 RX ORDER — CYCLOBENZAPRINE HCL 5 MG
5-10 TABLET ORAL EVERY 8 HOURS PRN
Qty: 30 TABLET | Refills: 0 | Status: SHIPPED | OUTPATIENT
Start: 2024-04-22

## 2024-04-22 ASSESSMENT — ENCOUNTER SYMPTOMS
RESPIRATORY NEGATIVE: 1
SENSORY CHANGE: 0
BACK PAIN: 1
CARDIOVASCULAR NEGATIVE: 1
GASTROINTESTINAL NEGATIVE: 1
NEUROLOGICAL NEGATIVE: 1
CONSTITUTIONAL NEGATIVE: 1
WEAKNESS: 0
FEVER: 0

## 2024-04-22 ASSESSMENT — FIBROSIS 4 INDEX: FIB4 SCORE: 0.94

## 2024-04-22 ASSESSMENT — VISUAL ACUITY: OU: 1

## 2024-04-22 NOTE — PROGRESS NOTES
"Subjective:     Darien Martinez is a 53 y.o. male who presents for Back Pain (X5 days)       Back Pain  This is a new problem. The problem has been gradually worsening since onset. Pertinent negatives include no fever or weakness.     Last Wednesday, patient was performing some yard work when he noticed new onset of left middle back pain.  Worsens with palpation and range of motion.  Pain with sometimes radiate down the back of his leg.  Has taken ibuprofen.  Denies fever, respiratory problems, dysuria, or other symptoms at this time.  Advised by his wife to come in for further evaluation.    Review of Systems   Constitutional: Negative.  Negative for fever.   Respiratory: Negative.     Cardiovascular: Negative.    Gastrointestinal: Negative.    Genitourinary: Negative.    Musculoskeletal:  Positive for back pain.   Skin: Negative.  Negative for rash.   Neurological: Negative.  Negative for sensory change and weakness.   All other systems reviewed and are negative.    Refer to HPI for additional details.    During this visit, appropriate PPE was worn, and hand hygiene was performed.    PMH:  has a past medical history of Hyperlipidemia.    He has no past medical history of Asthma, Diabetes (HCC), or Hypertension.    MEDS:   Current Outpatient Medications:     cyclobenzaprine (FLEXERIL) 5 mg tablet, Take 1-2 Tablets by mouth every 8 hours as needed for Muscle Spasms., Disp: 30 Tablet, Rfl: 0    ALLERGIES: No Known Allergies  SURGHX:   Past Surgical History:   Procedure Laterality Date    TYMPANOPLASTY      TYMPANOTOMY       SOCHX:  reports that he has never smoked. His smokeless tobacco use includes chew. He reports current alcohol use. He reports that he does not use drugs.    FH: Per HPI as applicable/pertinent.      Objective:     /64 (BP Location: Left arm, Patient Position: Sitting, BP Cuff Size: Adult)   Pulse 71   Temp 36.7 °C (98.1 °F) (Temporal)   Resp 20   Ht 1.727 m (5' 8\")   Wt 87.1 kg " (192 lb) Comment: per patient  SpO2 98%   BMI 29.19 kg/m²     Physical Exam  Nursing note reviewed.   Constitutional:       General: He is not in acute distress.     Appearance: He is well-developed. He is not ill-appearing or toxic-appearing.   Eyes:      General: Vision grossly intact.   Cardiovascular:      Rate and Rhythm: Normal rate.   Pulmonary:      Effort: Pulmonary effort is normal. No respiratory distress.   Musculoskeletal:         General: No deformity.      Lumbar back: Spasms and tenderness present. No swelling or deformity. Decreased range of motion (Pain).      Comments: Localized TTP at left thoracolumbar region   Skin:     General: Skin is warm and dry.      Coloration: Skin is not pale.      Findings: No rash.   Neurological:      Mental Status: He is alert and oriented to person, place, and time.      Motor: No weakness.      Gait: Gait normal.   Psychiatric:         Behavior: Behavior normal. Behavior is cooperative.       Assessment/Plan:     1. Strain of lumbar region, initial encounter  - cyclobenzaprine (FLEXERIL) 5 mg tablet; Take 1-2 Tablets by mouth every 8 hours as needed for Muscle Spasms.  Dispense: 30 Tablet; Refill: 0    Rest. May use over-the-counter acetaminophen alternating with ibuprofen, per 's instructions, for additional pain relief. May apply heat up to 20 minutes at a time. May use gentle massage, stretching, and range of motion exercises as tolerated and as symptoms improve. Rx as above sent electronically. Caution drowsiness.    Monitor. Warning signs reviewed. Return precautions advised.     Differential diagnosis, natural history, supportive care, over-the-counter symptom management per 's instructions, close monitoring, and indications for immediate follow-up discussed.     All questions answered. Patient agrees with the plan of care.    Discharge summary provided via [employer].

## 2024-09-17 ENCOUNTER — HOSPITAL ENCOUNTER (OUTPATIENT)
Facility: MEDICAL CENTER | Age: 54
End: 2024-09-17
Attending: NURSE PRACTITIONER
Payer: COMMERCIAL

## 2024-09-17 ENCOUNTER — OFFICE VISIT (OUTPATIENT)
Dept: MEDICAL GROUP | Facility: LAB | Age: 54
End: 2024-09-17
Payer: COMMERCIAL

## 2024-09-17 VITALS
WEIGHT: 183.8 LBS | OXYGEN SATURATION: 99 % | BODY MASS INDEX: 27.86 KG/M2 | RESPIRATION RATE: 14 BRPM | SYSTOLIC BLOOD PRESSURE: 122 MMHG | TEMPERATURE: 98.2 F | HEART RATE: 66 BPM | DIASTOLIC BLOOD PRESSURE: 60 MMHG | HEIGHT: 68 IN

## 2024-09-17 DIAGNOSIS — G47.26 SHIFT WORK SLEEP DISORDER: ICD-10-CM

## 2024-09-17 DIAGNOSIS — G89.29 CHRONIC PAIN OF BOTH HIPS: ICD-10-CM

## 2024-09-17 DIAGNOSIS — Z00.00 PREVENTATIVE HEALTH CARE: ICD-10-CM

## 2024-09-17 DIAGNOSIS — M51.369 DDD (DEGENERATIVE DISC DISEASE), LUMBAR: ICD-10-CM

## 2024-09-17 DIAGNOSIS — E78.2 MIXED HYPERLIPIDEMIA: ICD-10-CM

## 2024-09-17 DIAGNOSIS — M25.552 CHRONIC PAIN OF BOTH HIPS: ICD-10-CM

## 2024-09-17 DIAGNOSIS — M25.551 CHRONIC PAIN OF BOTH HIPS: ICD-10-CM

## 2024-09-17 PROCEDURE — 3074F SYST BP LT 130 MM HG: CPT | Performed by: NURSE PRACTITIONER

## 2024-09-17 PROCEDURE — 3078F DIAST BP <80 MM HG: CPT | Performed by: NURSE PRACTITIONER

## 2024-09-17 PROCEDURE — 80307 DRUG TEST PRSMV CHEM ANLYZR: CPT

## 2024-09-17 PROCEDURE — 99214 OFFICE O/P EST MOD 30 MIN: CPT | Performed by: NURSE PRACTITIONER

## 2024-09-17 RX ORDER — ROSUVASTATIN CALCIUM 5 MG/1
5 TABLET, COATED ORAL EVERY EVENING
Qty: 90 TABLET | Refills: 3 | Status: SHIPPED | OUTPATIENT
Start: 2024-09-17

## 2024-09-17 RX ORDER — MELOXICAM 15 MG/1
15 TABLET ORAL DAILY
Qty: 30 TABLET | Refills: 2 | Status: SHIPPED | OUTPATIENT
Start: 2024-09-17

## 2024-09-17 RX ORDER — MODAFINIL 200 MG/1
200 TABLET ORAL DAILY
Qty: 30 TABLET | Refills: 0 | Status: SHIPPED | OUTPATIENT
Start: 2024-09-17 | End: 2024-10-17

## 2024-09-17 RX ORDER — CYCLOBENZAPRINE HCL 5 MG
5-10 TABLET ORAL EVERY 8 HOURS PRN
Qty: 30 TABLET | Refills: 0 | Status: SHIPPED | OUTPATIENT
Start: 2024-09-17

## 2024-09-17 ASSESSMENT — FIBROSIS 4 INDEX: FIB4 SCORE: 0.96

## 2024-09-17 ASSESSMENT — PATIENT HEALTH QUESTIONNAIRE - PHQ9: CLINICAL INTERPRETATION OF PHQ2 SCORE: 0

## 2024-09-17 NOTE — PROGRESS NOTES
Chief Complaint   Patient presents with    Back Pain     Lower R side , Hip R side      Verbal consent was acquired by the patient to use Sit[employer]ape ambient listening note generation during this visit Yes      HPI:  History of Present Illness  The patient presents for evaluation of multiple medical concerns.    Hip and back pain  He has been experiencing hip inflammation, which was previously managed but has worsened over the past 6 months. An x-ray of his right hip revealed osteoarthritis bilaterally. Over the last 4 to 5 months, he has been stretching upon waking, taking vitamins, and undergoing joint therapy. He describes a sensation akin to a pulled groin muscle, with tightness and inflammation. He uses arthritis cream and wears a hip brace for support. He has tried cyclobenzaprine in the past, which provided some relief. He visited urgent care 4 to 5 months ago when his pain worsened and was prescribed medication. He takes Advil PM nightly and Advil throughout the day, totaling 800 mg per day. He is unsure if these medications are still effective. He experiences constant pain and tightness, which radiates down his leg. He feels pain when lifting his legs and across his knee. He once visited a chiropractor who focused on lower back exercises, but these did not alleviate his tightness. He is considering cortisone injections as a treatment option.    He also reports back pain, which he attributes to degenerative disc disease diagnosed 5 to 6 years ago. His sciatic pain fluctuates, with good and bad days. He uses a lacrosse ball for self-massage, which provides temporary relief. Nightly hot tub soaks help loosen his muscles, but the relief is not permanent. He believes his symptoms are due to inflammation and lower back issues. He has been more active this summer, engaging in activities like lifting, camping, and golfing.    Shift work sleep disorder  He has been using Provigil, a medication for shift work, which  "he finds helpful. He is interested in obtaining a prescription for it.    Hyperlipidemia  His cholesterol levels were elevated during his last lab work. He has not made any dietary changes, but he has been more active this summer. He weighed 195 pounds last winter and currently weighs 182 pounds. He is open to starting cholesterol medication if necessary.    ROS:  As documented in history of present illness above    Exam:   /60 (BP Location: Right arm, Patient Position: Sitting, BP Cuff Size: Adult)   Pulse 66   Temp 36.8 °C (98.2 °F)   Resp 14   Ht 1.727 m (5' 8\")   Wt 83.4 kg (183 lb 12.8 oz)   SpO2 99%   BMI 27.95 kg/m²     Constitutional: Alert, no distress, well-groomed.  Skin: Warm, dry, good turgor, no rashes in visible areas.  Eye: Equal, round and reactive, conjunctiva clear, lids normal.  ENMT: Lips without lesions, good dentition, moist mucous membranes.  Neck: Trachea midline, no masses, no thyromegaly.  Respiratory: Unlabored respiratory effort, no cough.  MSK: Normal gait, moves all extremities.  Neuro: Grossly non-focal.   Psych: Alert and oriented x3, normal affect and mood.    Assessment / Plan:  Assessment & Plan  1. Degenerative Disc Disease.  Chronic, uncontrolled condition. A referral to physiatry for advanced imaging and potential interventional pain management will be made. A prescription for meloxicam 15 mg once daily and cyclobenzaprine 5 mg up to three times daily will be sent to the pharmacy- no driving or operating machinery on this medication, not to be used during working hours.. He is advised to avoid combining ibuprofen or Aleve with these medications. Benadryl can be taken as needed for sleep.    2. Osteoarthritis.  Osteoarthritis is present in both hips, with the right side being more affected. A referral to physiatry for potential interventional pain management will be made. He is advised to continue using a hip brace for stability and to avoid overexertion.    3. " Shift Work SleepDisorder.  A prescription for Provigil 200 mg to be taken as a single dose 1 hour prior to shift work will be sent to the pharmacy. A controlled substance agreement form will be signed, and a urine drug screen will be conducted.  Obtained and reviewed patient utilization report from [facility] pharmacy database on 9/17/2024 prior to writing prescription for controlled substance II, III or IV per [place] bill . Based on assessment of the report, the prescription is medically necessary.     4. Hypercholesterolemia.  Chronic, uncontrolled condition. A prescription for rosuvastatin 5 mg once daily will be sent to the pharmacy. Labs will be rechecked in 6 months to monitor cholesterol levels and assess the effectiveness of the medication.

## 2024-09-17 NOTE — ASSESSMENT & PLAN NOTE
Chronic condition. Due for labs. Patient currently managing with lifestyle modifications. The 10-year ASCVD risk score (Shahla CASEY, et al., 2019) is: 8.2%.  Denies chest pain, shortness of breath, heart palpitations.    Lab Results   Component Value Date/Time    CHOLSTRLTOT 269 (H) 05/17/2023 11:56 AM     (H) 05/17/2023 11:56 AM    HDL 40 05/17/2023 11:56 AM    TRIGLYCERIDE 191 (H) 05/17/2023 11:56 AM

## 2024-09-18 ENCOUNTER — OFFICE VISIT (OUTPATIENT)
Dept: PHYSICAL MEDICINE AND REHAB | Facility: MEDICAL CENTER | Age: 54
End: 2024-09-18
Payer: COMMERCIAL

## 2024-09-18 ENCOUNTER — HOSPITAL ENCOUNTER (OUTPATIENT)
Dept: RADIOLOGY | Facility: MEDICAL CENTER | Age: 54
End: 2024-09-18
Attending: STUDENT IN AN ORGANIZED HEALTH CARE EDUCATION/TRAINING PROGRAM
Payer: COMMERCIAL

## 2024-09-18 VITALS
HEIGHT: 68 IN | DIASTOLIC BLOOD PRESSURE: 84 MMHG | WEIGHT: 186.07 LBS | TEMPERATURE: 98.4 F | SYSTOLIC BLOOD PRESSURE: 118 MMHG | BODY MASS INDEX: 28.2 KG/M2 | HEART RATE: 57 BPM | OXYGEN SATURATION: 95 %

## 2024-09-18 DIAGNOSIS — M54.41 CHRONIC RIGHT-SIDED LOW BACK PAIN WITH RIGHT-SIDED SCIATICA: ICD-10-CM

## 2024-09-18 DIAGNOSIS — G89.29 CHRONIC RIGHT-SIDED LOW BACK PAIN WITH RIGHT-SIDED SCIATICA: ICD-10-CM

## 2024-09-18 DIAGNOSIS — R20.2 NUMBNESS AND TINGLING OF RIGHT LEG: ICD-10-CM

## 2024-09-18 DIAGNOSIS — G47.26 SHIFT WORK SLEEP DISORDER: ICD-10-CM

## 2024-09-18 DIAGNOSIS — M16.11 OSTEOARTHRITIS OF RIGHT HIP, UNSPECIFIED OSTEOARTHRITIS TYPE: Primary | ICD-10-CM

## 2024-09-18 DIAGNOSIS — R20.0 NUMBNESS AND TINGLING OF RIGHT LEG: ICD-10-CM

## 2024-09-18 DIAGNOSIS — M25.551 CHRONIC PAIN OF RIGHT HIP: ICD-10-CM

## 2024-09-18 DIAGNOSIS — G89.29 CHRONIC PAIN OF RIGHT HIP: ICD-10-CM

## 2024-09-18 DIAGNOSIS — M16.11 OSTEOARTHRITIS OF RIGHT HIP, UNSPECIFIED OSTEOARTHRITIS TYPE: ICD-10-CM

## 2024-09-18 PROCEDURE — 73521 X-RAY EXAM HIPS BI 2 VIEWS: CPT

## 2024-09-18 PROCEDURE — 1125F AMNT PAIN NOTED PAIN PRSNT: CPT | Performed by: STUDENT IN AN ORGANIZED HEALTH CARE EDUCATION/TRAINING PROGRAM

## 2024-09-18 PROCEDURE — 99204 OFFICE O/P NEW MOD 45 MIN: CPT | Performed by: STUDENT IN AN ORGANIZED HEALTH CARE EDUCATION/TRAINING PROGRAM

## 2024-09-18 PROCEDURE — 72100 X-RAY EXAM L-S SPINE 2/3 VWS: CPT

## 2024-09-18 PROCEDURE — 3079F DIAST BP 80-89 MM HG: CPT | Performed by: STUDENT IN AN ORGANIZED HEALTH CARE EDUCATION/TRAINING PROGRAM

## 2024-09-18 PROCEDURE — 3074F SYST BP LT 130 MM HG: CPT | Performed by: STUDENT IN AN ORGANIZED HEALTH CARE EDUCATION/TRAINING PROGRAM

## 2024-09-18 ASSESSMENT — FIBROSIS 4 INDEX: FIB4 SCORE: 0.96

## 2024-09-18 ASSESSMENT — PATIENT HEALTH QUESTIONNAIRE - PHQ9
CLINICAL INTERPRETATION OF PHQ2 SCORE: 4
SUM OF ALL RESPONSES TO PHQ QUESTIONS 1-9: 6
5. POOR APPETITE OR OVEREATING: 0 - NOT AT ALL

## 2024-09-18 ASSESSMENT — PAIN SCALES - GENERAL: PAINLEVEL: 5=MODERATE PAIN

## 2024-09-18 NOTE — PROGRESS NOTES
NEW PATIENT VISIT     Interventional Spine and Pain  Physiatry (Physical Medicine and Rehabilitation)     Date of Service: See Epic  Chief Complaint:   Chief Complaint   Patient presents with    New Patient      DDD (degenerative disc disease), lumbar      Referring Provider: Maggy Painter A.P.R.N.  Patient Name: Darien Martinez   : 1970   MRN: [id number]     History:     HPI:     Darien Martinez is a 54 y.o. male  with history of chewing tobacco use who presents to clinic for evaluation of hip and back pain. Patient reports approximately 2 years of hip pain. He does note that in 2016 he had an MRI of his low back that demonstrated mild DDD. 2 years ago he started noting primarily stiffness in the hips. He does golf regularly and noted increased stiffness. Last year he increased his activity level with hiking, dancing, etc. Particularly when he increases his activity or sits for prolonged periods of time and then stands up his pain increases, and is now regularly 7 out of 10 in intensity. He has been to the chiropractor he gave him stretches to do. His pain is worst in the right groin, lateral hip, and low back, and present to a much lesser degree on the left. He has also started noticing aching and burning pain radiating in the posterolateral thigh and calf, stopping at the ankle which has become constant. He does use a lacrosse ball to roll out his muscles which does help somewhat. His goal is to avoid any type of surgery. He has been wearing a hip brace when golfing which seems to help somewhat. He notes increased pain in the right side of the low back which he attributes to altered gait biomechanics. He also notes over the past 2-3 weeks he has been getting numbness in the lateral thigh and calf which seems to happen nightly and is related to laying on his left side. He has been taking Advil 800 mg daily and was recently prescribed meloxicam and cyclobenzaprine. He does note  that right groin pain can radiate into the right anterior thigh. He does note some diffuse weakness in the right leg but denies any falls.    Red Flags ROS:  Fever, Chills, Sweats: Denies  Involuntary Weight Loss: Denies  Bladder Incontinence: Denies  Bowel Incontinence: Denies  Saddle Anesthesia: Denies       Medical Records Review:  I reviewed the note from the referring provider Maggy Painter A.P.R.N. including the note dated 9/17/24 where hip and back pain was discussed.       PMHx:   Past Medical History:   Diagnosis Date    Hyperlipidemia        Current Outpatient Medications on File Prior to Visit   Medication Sig Dispense Refill    cyclobenzaprine (FLEXERIL) 5 mg tablet Take 1-2 Tablets by mouth every 8 hours as needed for Muscle Spasms. 30 Tablet 0    meloxicam (MOBIC) 15 MG tablet Take 1 Tablet by mouth every day. 30 Tablet 2    modafinil (PROVIGIL) 200 MG Tab Take 1 Tablet by mouth every day for 30 days. 30 Tablet 0    rosuvastatin (CRESTOR) 5 MG Tab Take 1 Tablet by mouth every evening. 90 Tablet 3     No current facility-administered medications on file prior to visit.        PSHx:   Past Surgical History:   Procedure Laterality Date    TYMPANOPLASTY      TYMPANOTOMY         Family history   Family History   Problem Relation Age of Onset    Hyperlipidemia Mother     No Known Problems Father     Hypertension Brother     Heart Disease Maternal Grandfather 34        MI         Medications: Reviewed on [facility]  Outpatient Medications Marked as Taking for the 9/18/24 encounter (Office Visit) with Deana Fair M.D.   Medication Sig Dispense Refill    cyclobenzaprine (FLEXERIL) 5 mg tablet Take 1-2 Tablets by mouth every 8 hours as needed for Muscle Spasms. 30 Tablet 0    meloxicam (MOBIC) 15 MG tablet Take 1 Tablet by mouth every day. 30 Tablet 2    modafinil (PROVIGIL) 200 MG Tab Take 1 Tablet by mouth every day for 30 days. 30 Tablet 0    rosuvastatin (CRESTOR) 5 MG Tab Take 1 Tablet by mouth every  evening. 90 Tablet 3        Allergies:   No Known Allergies    Social Hx:   Social History     Socioeconomic History    Marital status:      Spouse name: Not on file    Number of children: Not on file    Years of education: Not on file    Highest education level: Associate degree: occupational, technical, or vocational program   Occupational History    Not on file   Tobacco Use    Smoking status: Never    Smokeless tobacco: Current     Types: Chew   Vaping Use    Vaping status: Never Used   Substance and Sexual Activity    Alcohol use: Yes     Comment: occ    Drug use: No    Sexual activity: Not on file   Other Topics Concern    Not on file   Social History Narrative    Not on file     Social Determinants of Health     Financial Resource Strain: Low Risk  (5/10/2023)    Overall Financial Resource Strain (CARDIA)     Difficulty of Paying Living Expenses: Not hard at all   Food Insecurity: No Food Insecurity (5/10/2023)    Hunger Vital Sign     Worried About Running Out of Food in the Last Year: Never true     Ran Out of Food in the Last Year: Never true   Transportation Needs: No Transportation Needs (5/10/2023)    PRAPARE - Transportation     Lack of Transportation (Medical): No     Lack of Transportation (Non-Medical): No   Physical Activity: Insufficiently Active (5/10/2023)    Exercise Vital Sign     Days of Exercise per Week: 1 day     Minutes of Exercise per Session: 10 min   Stress: No Stress Concern Present (5/10/2023)    [place] [employer] - Occupational Stress Questionnaire     Feeling of Stress : Not at all   Social Connections: Moderately Isolated (5/10/2023)    Social Connection and Isolation Panel [NHANES]     Frequency of Communication with Friends and Family: Twice a week     Frequency of Social Gatherings with Friends and Family: Once a week     Attends Baptism Services: Never     Active Member of Clubs or Organizations: No     Attends Club or Organization Meetings:  "Never     Marital Status:    Intimate Partner Violence: Not on file   Housing Stability: Low Risk  (5/10/2023)    Housing Stability Vital Sign     Unable to Pay for Housing in the Last Year: No     Number of Places Lived in the Last Year: 1     Unstable Housing in the Last Year: No          EXAMINATION     Physical Exam:   Vitals: /84 (BP Location: Right arm, Patient Position: Sitting, BP Cuff Size: Adult)   Pulse (!) 57   Temp 36.9 °C (98.4 °F) (Temporal)   Ht 1.727 m (5' 8\")   Wt 84.4 kg (186 lb 1.1 oz)   SpO2 95%     Constitutional:   Body Habitus: Body mass index is 28.29 kg/m².  Cooperation: Fully cooperates with exam  Appearance: Well-groomed, well-nourished  Eyes: No scleral icterus to suggest severe liver disease, no proptosis to suggest severe hyperthyroid  ENT: No obvious auditory deficits, no obvious tongue lesions, tongue midline, no facial droop   Respiratory:  Breathing comfortable on room air, no audible wheezing  Cardiovascular: Skin appears well-perfused  Psychiatric: Appropriate affect  Gait: antalgic gait, able to walk on toes and heels, some difficulty with tandem gait due to right hip pain/weakness    Neurologic:  Strength:    Right LE 4/5 in hip flexors due to pain, left LE 5/5 in hip flexors, bilateral LE 5/5 knee extensors and flexors, ankle dorsiflexors and plantarflexors, great toe extensors   Reflexes: 2+ in bilateral patella and achilles  Sensation: decreased sensation to light touch left lateral shin  MSK:?No?TTP across axial spinous processes and paraspinals bilaterally, has?preserved?active lumbar ROM without significant pain, TTP right greater trochanter    Special?tests:    Positive left slump test  Positive right FADIR, log roll  Negative thigh thrust, LANDON bilaterally       MEDICAL DECISION MAKING    Medical Records Review: See under HPI section    DATA    Labs:   Lab Results   Component Value Date/Time    SODIUM 138 05/17/2023 11:56 AM    POTASSIUM 4.4 " "05/17/2023 11:56 AM    CHLORIDE 103 05/17/2023 11:56 AM    CO2 22 05/17/2023 11:56 AM    ANION 13.0 05/17/2023 11:56 AM    GLUCOSE 82 05/17/2023 11:56 AM    BUN 19 05/17/2023 11:56 AM    CREATININE 0.91 05/17/2023 11:56 AM    CALCIUM 9.6 05/17/2023 11:56 AM    ASTSGOT 19 05/17/2023 11:56 AM    ALTSGPT 27 05/17/2023 11:56 AM    TBILIRUBIN 0.6 05/17/2023 11:56 AM    ALBUMIN 4.8 05/17/2023 11:56 AM    TOTPROTEIN 7.4 05/17/2023 11:56 AM    GLOBULIN 2.6 05/17/2023 11:56 AM    AGRATIO 1.8 05/17/2023 11:56 AM   ]    No results found for: \"PROTHROMBTM\", \"INR\"     Lab Results   Component Value Date/Time    WBC 4.0 (L) 05/17/2023 11:56 AM    RBC 5.32 05/17/2023 11:56 AM    HEMOGLOBIN 16.8 05/17/2023 11:56 AM    HEMATOCRIT 49.9 05/17/2023 11:56 AM    MCV 93.8 05/17/2023 11:56 AM    MCH 31.6 05/17/2023 11:56 AM    MCHC 33.7 05/17/2023 11:56 AM    MPV 12.0 05/17/2023 11:56 AM    NEUTSPOLYS 38.70 (L) 05/17/2023 11:56 AM    LYMPHOCYTES 41.50 (H) 05/17/2023 11:56 AM    MONOCYTES 9.20 05/17/2023 11:56 AM    EOSINOPHILS 8.70 (H) 05/17/2023 11:56 AM    BASOPHILS 1.70 05/17/2023 11:56 AM        Lab Results   Component Value Date/Time    HBA1C 5.4 05/17/2023 11:56 AM        Imaging:   I personally reviewed the following images, below are my independent reads:  X-Ray Bilateral Hips 8/18/23:  No evidence of fracture, moderate right and mild left hip joint space narrowing consistent with osteoarthritis.      IMAGING radiology reads: I reviewed the following radiology reports  X-Ray Bilateral Hips 8/18/23:  FINDINGS:  Mineralization is unremarkable for age.  No fracture.  There is moderate loss of RIGHT hip joint space with subchondral sclerosis and marginal osteophyte formation.  There is mild loss of LEFT hip joint space.  No bony erosion.     IMPRESSION:     RIGHT worse than LEFT hip osteoarthritis                                                                                        Diagnosis  Visit Diagnoses     ICD-10-CM   1. " Osteoarthritis of right hip, unspecified osteoarthritis type  M16.11   2. Chronic pain of right hip  M25.551    G89.29   3. Chronic right-sided low back pain with right-sided sciatica  M54.41    G89.29   4. Numbness and tingling of right leg  R20.0    R20.2         ASSESSMENT AND PLAN:  Darien Martinez is a 54 y.o. male who presents to clinic for evaluation of chronic right hip pain and right low back and leg pain.     Darien was seen today for new patient.    Diagnoses and all orders for this visit:    Osteoarthritis of right hip, unspecified osteoarthritis type  -     DX-HIP-BILATERAL-WITH PELVIS-2 VIEWS; Future  -     Consent for all Surgical, Special Diagnostic or Therapeutic Procedures  -     Referral to Physical Therapy    Chronic pain of right hip  -     DX-HIP-BILATERAL-WITH PELVIS-2 VIEWS; Future  -     Consent for all Surgical, Special Diagnostic or Therapeutic Procedures  -     Referral to Physical Therapy    Chronic right-sided low back pain with right-sided sciatica  -     DX-LUMBAR SPINE-2 OR 3 VIEWS; Future  -     Referral to Physical Therapy    Numbness and tingling of right leg  -     DX-LUMBAR SPINE-2 OR 3 VIEWS; Future  -     Referral to Physical Therapy          -Given patient's description of C-shaped pain in hip worse with ambulation and activity, antalgic gait, and evidence of right hip osteoarthritis on x-ray 1 year ago, primary pain is most consistent with hip osteoarthritis. Given the progression of his pain now with limping and pain-limited weakness in the hip flexors, I would like to obtain updated x-ray's of the hips to evaluate for progression and confirm there is no acute pathology prior to an ultrasound-guided right hip intraarticular steroid injection which has been ordered. I also think that given his right sided low back pain with pain and numbness radiating into the right posterolateral thigh and calf, he likely also has a component of lumbar radiculopathy, and so I would  like to obtain x-ray's of the lumbar spine and have provided him with low back exercises and a referral to PT to work on both his low back and right hip.      Physical therapy: I ordered physical therapy to focus on strengthening and stretching for the low back and hip.    Home Exercise Program: I provided the patient with a strengthening and stretching with a home exercise program targeting the low back and hip.    Diagnostic Workup: As above, x-ray lumbar spine and bilateral hips.    Medications: Meloxicam 15 mg daily x 2 weeks followed by PRN and cyclobenzaprine up to TID PRN as prescribed by PCP Maggy Painter. Discussed that cyclobenzaprine can cause somnolence so to try at night first, and not to take meloxicam with Advil or NSAIDs.    Interventional Treatment: Right hip ultrasound-guided IA steroid injection ordered.    Follow-up: In 1 week for ultrasound-guided right hip injection, then 1 month after injection.      Please note that this dictation was created using voice recognition software. I have made every reasonable attempt to correct obvious errors but there may be errors of grammar and content that I may have overlooked prior to finalization of this note.      Deana Fair MD  Physical Medicine and Rehabilitation  Interventional Spine and Sports Physiatry  [facility]       CC ALEJANDRO Marie.P.RERIC.   CC Maggy Painter A.P.RTROY.

## 2024-09-20 LAB
AMPHET CTO UR CFM-MCNC: NEGATIVE NG/ML
BARBITURATES CTO UR CFM-MCNC: NEGATIVE NG/ML
BENZODIAZ CTO UR CFM-MCNC: NEGATIVE NG/ML
BUPRENORPHINE UR-MCNC: NEGATIVE NG/ML
CANNABINOIDS CTO UR CFM-MCNC: NEGATIVE NG/ML
CARISOPRODOL UR-MCNC: NEGATIVE NG/ML
COCAINE CTO UR CFM-MCNC: NEGATIVE NG/ML
CREAT UR-MCNC: 120.2 MG/DL (ref 20–400)
DRUG SCREEN COMMENT UR-IMP: NORMAL
ETHYL GLUCURONIDE UR QL SCN: NEGATIVE NG/ML
FENTANYL UR-MCNC: NEGATIVE NG/ML
MEPERIDINE CTO UR SCN-MCNC: NEGATIVE NG/ML
METHADONE CTO UR CFM-MCNC: NEGATIVE NG/ML
OPIATES UR QL SCN: NEGATIVE NG/ML
OXYCDOXYM URSCRN 2005102: NEGATIVE NG/ML
PCP CTO UR CFM-MCNC: NEGATIVE NG/ML
PROPOXYPH CTO UR CFM-MCNC: NEGATIVE NG/ML
TAPENTADOL UR-MCNC: NEGATIVE NG/ML
TRAMADOL CTO UR SCN-MCNC: NEGATIVE NG/ML
ZOLPIDEM UR-MCNC: NEGATIVE NG/ML

## 2024-09-26 DIAGNOSIS — M16.11 PRIMARY OSTEOARTHRITIS OF RIGHT HIP: Primary | ICD-10-CM

## 2024-09-27 ENCOUNTER — OFFICE VISIT (OUTPATIENT)
Dept: PHYSICAL MEDICINE AND REHAB | Facility: MEDICAL CENTER | Age: 54
End: 2024-09-27
Payer: COMMERCIAL

## 2024-09-27 VITALS
BODY MASS INDEX: 28.4 KG/M2 | DIASTOLIC BLOOD PRESSURE: 76 MMHG | HEART RATE: 60 BPM | HEIGHT: 68 IN | OXYGEN SATURATION: 95 % | SYSTOLIC BLOOD PRESSURE: 112 MMHG | WEIGHT: 187.39 LBS | TEMPERATURE: 98.5 F

## 2024-09-27 DIAGNOSIS — M25.551 RIGHT HIP PAIN: ICD-10-CM

## 2024-09-27 DIAGNOSIS — M16.11 OSTEOARTHRITIS OF RIGHT HIP, UNSPECIFIED OSTEOARTHRITIS TYPE: Primary | ICD-10-CM

## 2024-09-27 RX ORDER — DEXAMETHASONE SODIUM PHOSPHATE 4 MG/ML
4 INJECTION, SOLUTION INTRA-ARTICULAR; INTRALESIONAL; INTRAMUSCULAR; INTRAVENOUS; SOFT TISSUE ONCE
Status: COMPLETED | OUTPATIENT
Start: 2024-09-27 | End: 2024-09-27

## 2024-09-27 RX ADMIN — DEXAMETHASONE SODIUM PHOSPHATE 4 MG: 4 INJECTION, SOLUTION INTRA-ARTICULAR; INTRALESIONAL; INTRAMUSCULAR; INTRAVENOUS; SOFT TISSUE at 16:01

## 2024-09-27 ASSESSMENT — PATIENT HEALTH QUESTIONNAIRE - PHQ9: CLINICAL INTERPRETATION OF PHQ2 SCORE: 0

## 2024-09-27 ASSESSMENT — PAIN SCALES - GENERAL: PAINLEVEL: 3=SLIGHT PAIN

## 2024-09-27 ASSESSMENT — FIBROSIS 4 INDEX: FIB4 SCORE: 0.96

## 2024-09-27 NOTE — PROCEDURES
Date of Service: 9/27/2024    Physician/s: Deana Fair MD    Pre-operative Diagnosis: right hip osteoarthritis    Post-operative Diagnosis: right hip osteoarthritis    Procedure: right hip injection ultrasound-guided    Description of procedure:    The risks, benefits, and alternatives of the procedure were reviewed and discussed with the patient.  Written informed consent was freely obtained. A pre-procedural time-out was conducted by the physician verifying patient’s identity, procedure to be performed, procedure site and side, and allergy verification. Appropriate equipment was determined to be in place for the procedure.     No sedation was used for this procedure.     A chaperone was present during the entire procedure    In the office suite the patient was placed in a supine position with his  right, and the skin was prepped and draped in the usual sterile fashion. The ultrasound probe was placed over the right  hip joint and the femoral head and neck junction was located and the target for injection was marked. A 25g 3.5 inch needle was placed into skin and advanced under ultrasound guidance into the joint space. Following negative aspiration, approx 3mL of 1% lidocaine and 3mL of 0.5% bupivicaine with 1mL of 4mg/mL dexamethasone was then injected into the joint, and the needle was subsequently removed intact after restyleted. The patient's hip was wiped with a 4x4 gauze, the area was cleansed with alcohol prep, and a bandaid was applied. There were no complications noted.     Preprocedural pain: 3/10  Postprocedural pain: 1/10    Deana Fair MD  Physical Medicine and Rehabilitation  Interventional Spine and Sports Physiatry  [facility]

## 2024-12-21 DIAGNOSIS — M25.551 CHRONIC PAIN OF BOTH HIPS: ICD-10-CM

## 2024-12-21 DIAGNOSIS — M51.369 DDD (DEGENERATIVE DISC DISEASE), LUMBAR: ICD-10-CM

## 2024-12-21 DIAGNOSIS — M25.552 CHRONIC PAIN OF BOTH HIPS: ICD-10-CM

## 2024-12-21 DIAGNOSIS — G89.29 CHRONIC PAIN OF BOTH HIPS: ICD-10-CM

## 2024-12-23 RX ORDER — MELOXICAM 15 MG/1
15 TABLET ORAL DAILY
Qty: 30 TABLET | Refills: 0 | Status: SHIPPED | OUTPATIENT
Start: 2024-12-23

## 2025-01-23 DIAGNOSIS — E78.2 MIXED HYPERLIPIDEMIA: ICD-10-CM

## 2025-01-23 DIAGNOSIS — M25.552 CHRONIC PAIN OF BOTH HIPS: ICD-10-CM

## 2025-01-23 DIAGNOSIS — M25.551 CHRONIC PAIN OF BOTH HIPS: ICD-10-CM

## 2025-01-23 DIAGNOSIS — G89.29 CHRONIC PAIN OF BOTH HIPS: ICD-10-CM

## 2025-01-23 DIAGNOSIS — M51.369 DDD (DEGENERATIVE DISC DISEASE), LUMBAR: ICD-10-CM

## 2025-01-23 RX ORDER — MELOXICAM 15 MG/1
15 TABLET ORAL DAILY
Qty: 30 TABLET | Refills: 0 | Status: SHIPPED | OUTPATIENT
Start: 2025-01-23

## 2025-01-23 RX ORDER — ROSUVASTATIN CALCIUM 5 MG/1
5 TABLET, COATED ORAL EVERY EVENING
Qty: 90 TABLET | Refills: 3 | Status: SHIPPED | OUTPATIENT
Start: 2025-01-23

## 2025-01-23 RX ORDER — CYCLOBENZAPRINE HCL 5 MG
5-10 TABLET ORAL EVERY 8 HOURS PRN
Qty: 30 TABLET | Refills: 0 | Status: SHIPPED | OUTPATIENT
Start: 2025-01-23

## 2025-03-10 DIAGNOSIS — M25.551 CHRONIC PAIN OF BOTH HIPS: ICD-10-CM

## 2025-03-10 DIAGNOSIS — G89.29 CHRONIC PAIN OF BOTH HIPS: ICD-10-CM

## 2025-03-10 DIAGNOSIS — M25.552 CHRONIC PAIN OF BOTH HIPS: ICD-10-CM

## 2025-03-10 DIAGNOSIS — M51.369 DDD (DEGENERATIVE DISC DISEASE), LUMBAR: ICD-10-CM

## 2025-03-10 RX ORDER — MELOXICAM 15 MG/1
15 TABLET ORAL DAILY
Qty: 30 TABLET | Refills: 0 | Status: SHIPPED | OUTPATIENT
Start: 2025-03-10

## 2025-04-15 DIAGNOSIS — M25.552 CHRONIC PAIN OF BOTH HIPS: ICD-10-CM

## 2025-04-15 DIAGNOSIS — G89.29 CHRONIC PAIN OF BOTH HIPS: ICD-10-CM

## 2025-04-15 DIAGNOSIS — M51.369 DDD (DEGENERATIVE DISC DISEASE), LUMBAR: ICD-10-CM

## 2025-04-15 DIAGNOSIS — M25.551 CHRONIC PAIN OF BOTH HIPS: ICD-10-CM

## 2025-04-15 RX ORDER — MELOXICAM 15 MG/1
15 TABLET ORAL DAILY
Qty: 30 TABLET | Refills: 0 | Status: SHIPPED | OUTPATIENT
Start: 2025-04-15

## 2025-05-05 ENCOUNTER — HOSPITAL ENCOUNTER (OUTPATIENT)
Facility: MEDICAL CENTER | Age: 55
End: 2025-05-06
Attending: EMERGENCY MEDICINE
Payer: COMMERCIAL

## 2025-05-05 ENCOUNTER — APPOINTMENT (OUTPATIENT)
Dept: RADIOLOGY | Facility: MEDICAL CENTER | Age: 55
End: 2025-05-05
Attending: EMERGENCY MEDICINE
Payer: COMMERCIAL

## 2025-05-05 DIAGNOSIS — K76.9 LESION OF LIVER: ICD-10-CM

## 2025-05-05 DIAGNOSIS — Z79.1 NSAID LONG-TERM USE: ICD-10-CM

## 2025-05-05 DIAGNOSIS — K26.9 DUODENAL ULCER: ICD-10-CM

## 2025-05-05 LAB
ALBUMIN SERPL BCP-MCNC: 4.5 G/DL (ref 3.2–4.9)
ALBUMIN/GLOB SERPL: 1.5 G/DL
ALP SERPL-CCNC: 64 U/L (ref 30–99)
ALT SERPL-CCNC: 37 U/L (ref 2–50)
ANION GAP SERPL CALC-SCNC: 14 MMOL/L (ref 7–16)
AST SERPL-CCNC: 28 U/L (ref 12–45)
BASOPHILS # BLD AUTO: 1.5 % (ref 0–1.8)
BASOPHILS # BLD: 0.09 K/UL (ref 0–0.12)
BILIRUB SERPL-MCNC: 0.4 MG/DL (ref 0.1–1.5)
BUN SERPL-MCNC: 21 MG/DL (ref 8–22)
CALCIUM ALBUM COR SERPL-MCNC: 9.5 MG/DL (ref 8.5–10.5)
CALCIUM SERPL-MCNC: 9.9 MG/DL (ref 8.5–10.5)
CHLORIDE SERPL-SCNC: 104 MMOL/L (ref 96–112)
CO2 SERPL-SCNC: 21 MMOL/L (ref 20–33)
CREAT SERPL-MCNC: 0.99 MG/DL (ref 0.5–1.4)
EOSINOPHIL # BLD AUTO: 0.26 K/UL (ref 0–0.51)
EOSINOPHIL NFR BLD: 4.3 % (ref 0–6.9)
ERYTHROCYTE [DISTWIDTH] IN BLOOD BY AUTOMATED COUNT: 43.8 FL (ref 35.9–50)
GFR SERPLBLD CREATININE-BSD FMLA CKD-EPI: 90 ML/MIN/1.73 M 2
GLOBULIN SER CALC-MCNC: 3.1 G/DL (ref 1.9–3.5)
GLUCOSE SERPL-MCNC: 87 MG/DL (ref 65–99)
HCT VFR BLD AUTO: 47.4 % (ref 42–52)
HGB BLD-MCNC: 16.2 G/DL (ref 14–18)
IMM GRANULOCYTES # BLD AUTO: 0.02 K/UL (ref 0–0.11)
IMM GRANULOCYTES NFR BLD AUTO: 0.3 % (ref 0–0.9)
LIPASE SERPL-CCNC: 51 U/L (ref 11–82)
LYMPHOCYTES # BLD AUTO: 1.65 K/UL (ref 1–4.8)
LYMPHOCYTES NFR BLD: 27.2 % (ref 22–41)
MCH RBC QN AUTO: 30.8 PG (ref 27–33)
MCHC RBC AUTO-ENTMCNC: 34.2 G/DL (ref 32.3–36.5)
MCV RBC AUTO: 90.1 FL (ref 81.4–97.8)
MONOCYTES # BLD AUTO: 0.46 K/UL (ref 0–0.85)
MONOCYTES NFR BLD AUTO: 7.6 % (ref 0–13.4)
NEUTROPHILS # BLD AUTO: 3.58 K/UL (ref 1.82–7.42)
NEUTROPHILS NFR BLD: 59.1 % (ref 44–72)
NRBC # BLD AUTO: 0 K/UL
NRBC BLD-RTO: 0 /100 WBC (ref 0–0.2)
PLATELET # BLD AUTO: 244 K/UL (ref 164–446)
PMV BLD AUTO: 11.2 FL (ref 9–12.9)
POTASSIUM SERPL-SCNC: 4.3 MMOL/L (ref 3.6–5.5)
PROT SERPL-MCNC: 7.6 G/DL (ref 6–8.2)
RBC # BLD AUTO: 5.26 M/UL (ref 4.7–6.1)
SODIUM SERPL-SCNC: 139 MMOL/L (ref 135–145)
WBC # BLD AUTO: 6.1 K/UL (ref 4.8–10.8)

## 2025-05-05 PROCEDURE — 99222 1ST HOSP IP/OBS MODERATE 55: CPT | Mod: GC

## 2025-05-05 PROCEDURE — 96374 THER/PROPH/DIAG INJ IV PUSH: CPT

## 2025-05-05 PROCEDURE — 85025 COMPLETE CBC W/AUTO DIFF WBC: CPT

## 2025-05-05 PROCEDURE — 700117 HCHG RX CONTRAST REV CODE 255: Performed by: EMERGENCY MEDICINE

## 2025-05-05 PROCEDURE — 700105 HCHG RX REV CODE 258: Performed by: EMERGENCY MEDICINE

## 2025-05-05 PROCEDURE — 74177 CT ABD & PELVIS W/CONTRAST: CPT

## 2025-05-05 PROCEDURE — 83735 ASSAY OF MAGNESIUM: CPT

## 2025-05-05 PROCEDURE — A9270 NON-COVERED ITEM OR SERVICE: HCPCS | Performed by: EMERGENCY MEDICINE

## 2025-05-05 PROCEDURE — 770001 HCHG ROOM/CARE - MED/SURG/GYN PRIV*

## 2025-05-05 PROCEDURE — 96375 TX/PRO/DX INJ NEW DRUG ADDON: CPT

## 2025-05-05 PROCEDURE — 700102 HCHG RX REV CODE 250 W/ 637 OVERRIDE(OP): Performed by: EMERGENCY MEDICINE

## 2025-05-05 PROCEDURE — 700105 HCHG RX REV CODE 258

## 2025-05-05 PROCEDURE — 80053 COMPREHEN METABOLIC PANEL: CPT

## 2025-05-05 PROCEDURE — 83690 ASSAY OF LIPASE: CPT

## 2025-05-05 PROCEDURE — 99285 EMERGENCY DEPT VISIT HI MDM: CPT

## 2025-05-05 PROCEDURE — 700102 HCHG RX REV CODE 250 W/ 637 OVERRIDE(OP)

## 2025-05-05 PROCEDURE — A9270 NON-COVERED ITEM OR SERVICE: HCPCS

## 2025-05-05 PROCEDURE — 36415 COLL VENOUS BLD VENIPUNCTURE: CPT

## 2025-05-05 PROCEDURE — 700111 HCHG RX REV CODE 636 W/ 250 OVERRIDE (IP): Performed by: EMERGENCY MEDICINE

## 2025-05-05 RX ORDER — IBUPROFEN 200 MG
600 TABLET ORAL EVERY 6 HOURS PRN
Status: SHIPPED | COMMUNITY
End: 2025-05-06 | Stop reason: SINTOL

## 2025-05-05 RX ORDER — PANTOPRAZOLE SODIUM 40 MG/10ML
80 INJECTION, POWDER, LYOPHILIZED, FOR SOLUTION INTRAVENOUS ONCE
Status: COMPLETED | OUTPATIENT
Start: 2025-05-05 | End: 2025-05-05

## 2025-05-05 RX ORDER — SODIUM CHLORIDE 9 MG/ML
1000 INJECTION, SOLUTION INTRAVENOUS CONTINUOUS
Status: DISCONTINUED | OUTPATIENT
Start: 2025-05-06 | End: 2025-05-06

## 2025-05-05 RX ORDER — SUCRALFATE ORAL 1 G/10ML
1 SUSPENSION ORAL EVERY 6 HOURS
Status: DISCONTINUED | OUTPATIENT
Start: 2025-05-06 | End: 2025-05-06

## 2025-05-05 RX ORDER — MORPHINE SULFATE 4 MG/ML
4 INJECTION INTRAVENOUS ONCE
Status: COMPLETED | OUTPATIENT
Start: 2025-05-05 | End: 2025-05-05

## 2025-05-05 RX ORDER — LABETALOL HYDROCHLORIDE 5 MG/ML
10 INJECTION, SOLUTION INTRAVENOUS EVERY 4 HOURS PRN
Status: DISCONTINUED | OUTPATIENT
Start: 2025-05-05 | End: 2025-05-06 | Stop reason: HOSPADM

## 2025-05-05 RX ORDER — SODIUM CHLORIDE 9 MG/ML
INJECTION, SOLUTION INTRAVENOUS ONCE
Status: COMPLETED | OUTPATIENT
Start: 2025-05-05 | End: 2025-05-05

## 2025-05-05 RX ADMIN — PANTOPRAZOLE SODIUM 80 MG: 40 INJECTION, POWDER, FOR SOLUTION INTRAVENOUS at 23:51

## 2025-05-05 RX ADMIN — SUCRALFATE ORAL SUSPENSION 1 G: 1 SUSPENSION ORAL at 23:51

## 2025-05-05 RX ADMIN — SODIUM CHLORIDE 1000 ML: 9 INJECTION, SOLUTION INTRAVENOUS at 23:36

## 2025-05-05 RX ADMIN — MORPHINE SULFATE 4 MG: 4 INJECTION INTRAVENOUS at 22:03

## 2025-05-05 RX ADMIN — SODIUM CHLORIDE: 9 INJECTION, SOLUTION INTRAVENOUS at 22:03

## 2025-05-05 RX ADMIN — IOHEXOL 95 ML: 350 INJECTION, SOLUTION INTRAVENOUS at 21:45

## 2025-05-05 RX ADMIN — LIDOCAINE HYDROCHLORIDE 30 ML: 20 SOLUTION ORAL; TOPICAL at 19:38

## 2025-05-05 ASSESSMENT — PAIN DESCRIPTION - PAIN TYPE
TYPE: ACUTE PAIN
TYPE: ACUTE PAIN

## 2025-05-05 ASSESSMENT — FIBROSIS 4 INDEX: FIB4 SCORE: 0.98

## 2025-05-06 ENCOUNTER — PHARMACY VISIT (OUTPATIENT)
Dept: PHARMACY | Facility: MEDICAL CENTER | Age: 55
End: 2025-05-06
Payer: COMMERCIAL

## 2025-05-06 ENCOUNTER — HOSPITAL ENCOUNTER (OUTPATIENT)
Dept: RADIOLOGY | Facility: MEDICAL CENTER | Age: 55
End: 2025-05-06
Attending: EMERGENCY MEDICINE
Payer: COMMERCIAL

## 2025-05-06 VITALS
TEMPERATURE: 97.3 F | OXYGEN SATURATION: 91 % | WEIGHT: 187.61 LBS | DIASTOLIC BLOOD PRESSURE: 50 MMHG | HEIGHT: 68 IN | RESPIRATION RATE: 16 BRPM | SYSTOLIC BLOOD PRESSURE: 96 MMHG | HEART RATE: 50 BPM | BODY MASS INDEX: 28.43 KG/M2

## 2025-05-06 PROBLEM — G89.29 CHRONIC PAIN OF BOTH HIPS: Status: RESOLVED | Noted: 2023-05-16 | Resolved: 2025-05-06

## 2025-05-06 PROBLEM — K76.9 LESION OF LIVER: Status: ACTIVE | Noted: 2025-05-06

## 2025-05-06 PROBLEM — G89.29 OTHER CHRONIC PAIN: Status: ACTIVE | Noted: 2025-05-06

## 2025-05-06 PROBLEM — M25.551 CHRONIC PAIN OF BOTH HIPS: Status: RESOLVED | Noted: 2023-05-16 | Resolved: 2025-05-06

## 2025-05-06 PROBLEM — M25.552 CHRONIC PAIN OF BOTH HIPS: Status: RESOLVED | Noted: 2023-05-16 | Resolved: 2025-05-06

## 2025-05-06 LAB
ALBUMIN SERPL BCP-MCNC: 3.9 G/DL (ref 3.2–4.9)
ALBUMIN/GLOB SERPL: 1.6 G/DL
ALP SERPL-CCNC: 56 U/L (ref 30–99)
ALT SERPL-CCNC: 32 U/L (ref 2–50)
ANION GAP SERPL CALC-SCNC: 9 MMOL/L (ref 7–16)
APPEARANCE UR: CLEAR
AST SERPL-CCNC: 26 U/L (ref 12–45)
BILIRUB SERPL-MCNC: 0.6 MG/DL (ref 0.1–1.5)
BILIRUB UR QL STRIP.AUTO: NEGATIVE
BUN SERPL-MCNC: 17 MG/DL (ref 8–22)
CALCIUM ALBUM COR SERPL-MCNC: 9.3 MG/DL (ref 8.5–10.5)
CALCIUM SERPL-MCNC: 9.2 MG/DL (ref 8.5–10.5)
CHLORIDE SERPL-SCNC: 104 MMOL/L (ref 96–112)
CO2 SERPL-SCNC: 24 MMOL/L (ref 20–33)
COLOR UR: YELLOW
CREAT SERPL-MCNC: 1.01 MG/DL (ref 0.5–1.4)
ERYTHROCYTE [DISTWIDTH] IN BLOOD BY AUTOMATED COUNT: 45.9 FL (ref 35.9–50)
GFR SERPLBLD CREATININE-BSD FMLA CKD-EPI: 88 ML/MIN/1.73 M 2
GLOBULIN SER CALC-MCNC: 2.5 G/DL (ref 1.9–3.5)
GLUCOSE SERPL-MCNC: 80 MG/DL (ref 65–99)
GLUCOSE UR STRIP.AUTO-MCNC: NEGATIVE MG/DL
HCT VFR BLD AUTO: 42.6 % (ref 42–52)
HGB BLD-MCNC: 14.6 G/DL (ref 14–18)
KETONES UR STRIP.AUTO-MCNC: NEGATIVE MG/DL
LEUKOCYTE ESTERASE UR QL STRIP.AUTO: NEGATIVE
MAGNESIUM SERPL-MCNC: 2 MG/DL (ref 1.5–2.5)
MCH RBC QN AUTO: 31.2 PG (ref 27–33)
MCHC RBC AUTO-ENTMCNC: 34.3 G/DL (ref 32.3–36.5)
MCV RBC AUTO: 91 FL (ref 81.4–97.8)
MICRO URNS: NORMAL
NITRITE UR QL STRIP.AUTO: NEGATIVE
PH UR STRIP.AUTO: 5.5 [PH] (ref 5–8)
PLATELET # BLD AUTO: 204 K/UL (ref 164–446)
PMV BLD AUTO: 11.1 FL (ref 9–12.9)
POTASSIUM SERPL-SCNC: 4.3 MMOL/L (ref 3.6–5.5)
PROT SERPL-MCNC: 6.4 G/DL (ref 6–8.2)
PROT UR QL STRIP: NEGATIVE MG/DL
RBC # BLD AUTO: 4.68 M/UL (ref 4.7–6.1)
RBC UR QL AUTO: NEGATIVE
SODIUM SERPL-SCNC: 137 MMOL/L (ref 135–145)
SP GR UR STRIP.AUTO: >1.045
UROBILINOGEN UR STRIP.AUTO-MCNC: 0.2 EU/DL
WBC # BLD AUTO: 5.1 K/UL (ref 4.8–10.8)

## 2025-05-06 PROCEDURE — 74176 CT ABD & PELVIS W/O CONTRAST: CPT

## 2025-05-06 PROCEDURE — 700111 HCHG RX REV CODE 636 W/ 250 OVERRIDE (IP)

## 2025-05-06 PROCEDURE — 99239 HOSP IP/OBS DSCHRG MGMT >30: CPT | Mod: GC | Performed by: INTERNAL MEDICINE

## 2025-05-06 PROCEDURE — RXMED WILLOW AMBULATORY MEDICATION CHARGE

## 2025-05-06 PROCEDURE — A9270 NON-COVERED ITEM OR SERVICE: HCPCS

## 2025-05-06 PROCEDURE — 700102 HCHG RX REV CODE 250 W/ 637 OVERRIDE(OP)

## 2025-05-06 PROCEDURE — 81003 URINALYSIS AUTO W/O SCOPE: CPT

## 2025-05-06 PROCEDURE — 700117 HCHG RX CONTRAST REV CODE 255: Performed by: EMERGENCY MEDICINE

## 2025-05-06 PROCEDURE — 80053 COMPREHEN METABOLIC PANEL: CPT

## 2025-05-06 PROCEDURE — 99222 1ST HOSP IP/OBS MODERATE 55: CPT | Performed by: INTERNAL MEDICINE

## 2025-05-06 PROCEDURE — G0378 HOSPITAL OBSERVATION PER HR: HCPCS

## 2025-05-06 PROCEDURE — 85027 COMPLETE CBC AUTOMATED: CPT

## 2025-05-06 RX ORDER — MORPHINE SULFATE 4 MG/ML
2 INJECTION INTRAVENOUS
Status: DISCONTINUED | OUTPATIENT
Start: 2025-05-05 | End: 2025-05-06 | Stop reason: HOSPADM

## 2025-05-06 RX ORDER — PANTOPRAZOLE SODIUM 40 MG/10ML
40 INJECTION, POWDER, LYOPHILIZED, FOR SOLUTION INTRAVENOUS 2 TIMES DAILY
Status: DISCONTINUED | OUTPATIENT
Start: 2025-05-06 | End: 2025-05-06

## 2025-05-06 RX ORDER — OMEPRAZOLE 20 MG/1
40 CAPSULE, DELAYED RELEASE ORAL 2 TIMES DAILY
Status: DISCONTINUED | OUTPATIENT
Start: 2025-05-06 | End: 2025-05-06 | Stop reason: HOSPADM

## 2025-05-06 RX ORDER — OMEPRAZOLE 40 MG/1
40 CAPSULE, DELAYED RELEASE ORAL 2 TIMES DAILY
Qty: 120 CAPSULE | Refills: 0 | Status: SHIPPED | OUTPATIENT
Start: 2025-05-06

## 2025-05-06 RX ORDER — OMEPRAZOLE 40 MG/1
40 CAPSULE, DELAYED RELEASE ORAL 2 TIMES DAILY
Qty: 120 CAPSULE | Refills: 0 | Status: SHIPPED | OUTPATIENT
Start: 2025-05-06 | End: 2025-05-06

## 2025-05-06 RX ORDER — ROSUVASTATIN CALCIUM 5 MG/1
5 TABLET, COATED ORAL EVERY EVENING
Status: DISCONTINUED | OUTPATIENT
Start: 2025-05-06 | End: 2025-05-06 | Stop reason: HOSPADM

## 2025-05-06 RX ADMIN — OMEPRAZOLE 40 MG: 20 CAPSULE, DELAYED RELEASE ORAL at 08:47

## 2025-05-06 RX ADMIN — PANTOPRAZOLE SODIUM 40 MG: 40 INJECTION, POWDER, FOR SOLUTION INTRAVENOUS at 05:13

## 2025-05-06 RX ADMIN — SUCRALFATE ORAL SUSPENSION 1 G: 1 SUSPENSION ORAL at 05:13

## 2025-05-06 RX ADMIN — IOHEXOL 25 ML: 240 INJECTION, SOLUTION INTRATHECAL; INTRAVASCULAR; INTRAVENOUS; ORAL at 00:00

## 2025-05-06 ASSESSMENT — LIFESTYLE VARIABLES
TOTAL SCORE: 0
HAVE YOU EVER FELT YOU SHOULD CUT DOWN ON YOUR DRINKING: NO
EVER FELT BAD OR GUILTY ABOUT YOUR DRINKING: NO
ALCOHOL_USE: NO
TOTAL SCORE: 0
HOW MANY TIMES IN THE PAST YEAR HAVE YOU HAD 5 OR MORE DRINKS IN A DAY: 0
SUBSTANCE_ABUSE: 0
ON A TYPICAL DAY WHEN YOU DRINK ALCOHOL HOW MANY DRINKS DO YOU HAVE: 0
TOTAL SCORE: 0
EVER HAD A DRINK FIRST THING IN THE MORNING TO STEADY YOUR NERVES TO GET RID OF A HANGOVER: NO
HAVE PEOPLE ANNOYED YOU BY CRITICIZING YOUR DRINKING: NO
DOES PATIENT WANT TO STOP DRINKING: NO
AVERAGE NUMBER OF DAYS PER WEEK YOU HAVE A DRINK CONTAINING ALCOHOL: 0
CONSUMPTION TOTAL: NEGATIVE

## 2025-05-06 ASSESSMENT — COGNITIVE AND FUNCTIONAL STATUS - GENERAL
DAILY ACTIVITIY SCORE: 24
SUGGESTED CMS G CODE MODIFIER DAILY ACTIVITY: CH
SUGGESTED CMS G CODE MODIFIER MOBILITY: CH
MOBILITY SCORE: 24

## 2025-05-06 ASSESSMENT — SOCIAL DETERMINANTS OF HEALTH (SDOH)

## 2025-05-06 ASSESSMENT — ENCOUNTER SYMPTOMS
CONSTITUTIONAL NEGATIVE: 1
CARDIOVASCULAR NEGATIVE: 1
DIZZINESS: 0
HEARTBURN: 0
ABDOMINAL PAIN: 1
BLOOD IN STOOL: 1
NEUROLOGICAL NEGATIVE: 1
SPUTUM PRODUCTION: 0
BACK PAIN: 1
PALPITATIONS: 0
CHILLS: 0
HEADACHES: 0
CONSTIPATION: 1
COUGH: 0
EYES NEGATIVE: 1
HEMOPTYSIS: 0
DIARRHEA: 1
RESPIRATORY NEGATIVE: 1
SHORTNESS OF BREATH: 0
FEVER: 0
NAUSEA: 0

## 2025-05-06 ASSESSMENT — PAIN DESCRIPTION - PAIN TYPE: TYPE: ACUTE PAIN

## 2025-05-06 ASSESSMENT — FIBROSIS 4 INDEX: FIB4 SCORE: 1.04

## 2025-05-06 ASSESSMENT — PATIENT HEALTH QUESTIONNAIRE - PHQ9
1. LITTLE INTEREST OR PLEASURE IN DOING THINGS: NOT AT ALL
SUM OF ALL RESPONSES TO PHQ9 QUESTIONS 1 AND 2: 0
2. FEELING DOWN, DEPRESSED, IRRITABLE, OR HOPELESS: NOT AT ALL

## 2025-05-06 NOTE — CONSULTS
DATE OF CONSULTATION:  5/6/2025     REFERRING PHYSICIAN:   MIR Novak     CONSULTING PHYSICIAN:  Khurram Gaspar M.D.     REASON FOR CONSULTATION:  I have been asked by Dr. Novak to see the patient in surgical consultation for evaluation of abdominal pain.    HISTORY OF PRESENT ILLNESS: The patient is a 55-year-old male with a history of arthritis of the right hip (previously taking meloxicam and ibuprofen) who presents with an approximately 3-month history of upper abdominal pain that has worsened over the last week.  By-in-large, the pain has improved with the eating, but has become more severe over the past 3 days, prompting him to come to the ED.    On arrival to the emergency department, patient's vital signs are within normal limits.  Laboratory results were unremarkable, notably does not have a leukocytosis and LFTs are within normal limits.  CT of the abdomen and pelvis with IV contrast was notable for a intraluminal defect of the posterior aspect of the duodenal bulb with some possible periduodenal fat stranding which was interpreted as concerning for possible perforation.    The patient did note some improvement in his pain with a GI cocktail (states this lasted for about 45 minutes).  Hemodynamically stable and denies severe pain at this point    PAST MEDICAL HISTORY:  has a past medical history of Hyperlipidemia.    He has no past medical history of Asthma, Diabetes (HCC), or Hypertension.    PAST SURGICAL HISTORY:  has a past surgical history that includes tympanoplasty and tympanotomy.    ALLERGIES: No Known Allergies    CURRENT MEDICATIONS:    Home Medications       Reviewed by Darlene Saravia (Pharmacy Tech) on 05/05/25 at 2350  Med List Status: <None>     Medication Last Dose Status   CALCIUM PO 5/4/2025 Active   cyclobenzaprine (FLEXERIL) 5 mg tablet 4/21/2025 Active   ibuprofen (MOTRIN) 200 MG Tab 5/5/2025 Active   meloxicam (MOBIC) 15 MG tablet 5/2/2025 Active   NON SPECIFIED 5/5/2025 Active  "  rosuvastatin (CRESTOR) 5 MG Tab 4/28/2025 Active   VITAMIN D PO 5/4/2025 Active                  Audit from Redirected Encounters    **Home medications have not yet been reviewed for this encounter**         FAMILY HISTORY: family history includes Heart Disease (age of onset: 34) in his maternal grandfather; Hyperlipidemia in his mother; Hypertension in his brother; No Known Problems in his father.    SOCIAL HISTORY:  reports that he has never smoked. His smokeless tobacco use includes chew. He reports current alcohol use. He reports that he does not use drugs.    REVIEW OF SYSTEMS: Comprehensive review of systems is negative with the exception of the aforementioned HPI, PMH, and PSH bullets in accordance with CMS guidelines.    PHYSICAL EXAMINATION:    /79   Pulse 65   Temp 36.5 °C (97.7 °F) (Temporal)   Resp 15   Ht 1.727 m (5' 8\")   Wt 85 kg (187 lb 6.3 oz)   SpO2 93%   BMI 28.49 kg/m²   General: Resting comfortably in bed, no acute distress  Cardiovascular: Normal sinus rhythm on the monitor  Respiratory: Nonlabored breathing on room air  Abdomen: Soft, nontender, nondistended    LABORATORY VALUES:   Recent Labs     05/05/25  1850   WBC 6.1   RBC 5.26   HEMOGLOBIN 16.2   HEMATOCRIT 47.4   MCV 90.1   MCH 30.8   MCHC 34.2   RDW 43.8   PLATELETCT 244   MPV 11.2     Recent Labs     05/05/25  1850   SODIUM 139   POTASSIUM 4.3   CHLORIDE 104   CO2 21   GLUCOSE 87   BUN 21   CREATININE 0.99   CALCIUM 9.9     Recent Labs     05/05/25  1850   ASTSGOT 28   ALTSGPT 37   TBILIRUBIN 0.4   ALKPHOSPHAT 64   GLOBULIN 3.1            IMAGING:   CT-ABDOMEN-PELVIS WITH   Final Result         1.  Defect in the posterior wall of the duodenal bulb with adjacent hazy fat stranding, appearance most compatible with duodenal ulcer which appears perforating are near perforating, further evaluation with endoscopy recommended.   2.  Hepatomegaly and changes of cirrhosis.   3.  Subcentimeter ill-defined low-density hepatic " lesions, indeterminate due to size.   4.  Fat-containing bilateral inguinal hernias.   5.  Small fat-containing umbilical hernia   6.  Atherosclerosis.      These findings were discussed with the patient's clinician, Elias Novak, on 5/5/2025 9:34 PM.      CT-RENAL COLIC EVALUATION(A/P W/O)    (Results Pending)       ASSESSMENT AND PLAN:   55-year-old male with history of significant NSAID use in the setting of right hip arthritis who presents with worsening upper abdominal pain in the setting of approximately 3 months of pain.  History and imaging are consistent with a duodenal peptic ulcer.  At this point, he is hemodynamically stable, with no leukocytosis.  All of which is reassuring.  Can obtain CT with p.o. contrast to rule out contrast extravasation.  Would recommend endoscopy and medical management at this point.  Surgery will continue to follow    No new Assessment & Plan notes have been filed under this hospital service since the last note was generated.  Service: Surgery General      DISPOSITION: Medical evaluation and admission. The patient was admitted to the Medical Service prior to surgical consultation. [facility] Surgery [facility] Service will follow.     ____________________________________     Khurram Gaspar M.D.    DD: 5/6/2025  12:52 AM    AAST Grading System for EGS Conditions  ACS NSQIP Surgical Risk Calculator

## 2025-05-06 NOTE — ASSESSMENT & PLAN NOTE
Subcentimeter ill-defined low-density hepatic lesions, indeterminate due to size.   -Will need outpatient follow up

## 2025-05-06 NOTE — DISCHARGE INSTRUCTIONS
-Please avoid NSAID Usage   -Please see GI for EGD in 8 weeks   -Please use your acid suppressing medication twice a day for 8 weeks  -Please see GI and primary care for your liver lesions that were found incidentally on CT imaging.

## 2025-05-06 NOTE — ASSESSMENT & PLAN NOTE
Patient with history of chronic NSAID use of meloxicam, ibuprofen intermittently, socially binge drinks proximately 6+ drinks once per month.  CT scan showing significant duodenal ulcer with wall defect suspicious of perforation.  -GI and general surgery consulted from emergency room; appreciate recommendations.  Coordinating care regarding upcoming procedures  -Adding pantoprazole IV 40 mg twice daily, sucrafate.  Discussed likely long-term course upon discharge.  -Adding maintenance fluids overnight.  -Strict NPO.  - Adding morphine IV as needed doses  - Pending CT colic evaluation per specialist recommendations.

## 2025-05-06 NOTE — PROGRESS NOTES
Pt arrived to unit by patient transport. All vital signs stable, 4 eyes skin check completed. All belongings transported with patient. Plan of care discussed with patient verbalizing understanding. No questions or concerns by patient at this time.

## 2025-05-06 NOTE — CONSULTS
Gastroenterology Initial Consult Note               Author:  Ilene Roberts M.D. Date & Time Created: 5/6/2025 5:42 AM       Patient ID:  Name:             Darien Martinez  YOB: 1970  Age:                 55 y.o.  male  MRN:               [id number]      Referring Provider:  Elias Novak MD        Presenting Chief Complaint:  Duodenal ulcer      History of Present Illness:    This is a 55 y.o. male who presented to the ER last night with chronic abdominal pain but increased severity for 1 week.  Has had nausea but no vomiting.  He takes ibuprofen or meloxicam daily.  Patient also volunteered seeing blood in his stool    On presentation, hemodynamically stable.  CBC and CMP normal.  CT abd/pelvis with concerning for penetrating duodenal ulcer with question of perforation or near perforation.    Has had some rectal bleeding.  States colonoscopy 16 months ago at [facility] was negative for polyps but possibly hemorrhoids    Review of Systems:  Review of Systems   Constitutional: Negative.    HENT: Negative.     Eyes: Negative.    Respiratory: Negative.     Cardiovascular: Negative.    Gastrointestinal:  Positive for abdominal pain.   Genitourinary: Negative.    Musculoskeletal:  Positive for joint pain.   Skin: Negative.    Neurological: Negative.    Endo/Heme/Allergies: Negative.              Past Medical History:  Past Medical History:   Diagnosis Date    Hyperlipidemia      Active Hospital Problems    Diagnosis     Other chronic pain [G89.29]     Duodenal ulcer [K26.9]     Chewing tobacco use [Z72.0]     Mixed hyperlipidemia [E78.2]          Past Surgical History:  Past Surgical History:   Procedure Laterality Date    TYMPANOPLASTY      TYMPANOTOMY             Hospital Medications:  Current Facility-Administered Medications   Medication Dose Frequency Provider Last Rate Last Admin    morphine 4 MG/ML injection 2 mg  2 mg Q HOUR PRN Dangelo Aden D.O.        [Held by provider] rosuvastatin  (Crestor) tablet 5 mg  5 mg Q EVENING Dangelo Aden D.O.        pantoprazole (Protonix) injection 40 mg  40 mg BID Dangelo Aden D.O.   40 mg at 05/06/25 0513    sucralfate (Carafate) 1 GM/10ML suspension 1 g  1 g Q6HRS Dangelo Aden D.O.   1 g at 05/06/25 0513    NS infusion 1,000 mL  1,000 mL Continuous Dangelo Aden D.O. 75 mL/hr at 05/05/25 2336 1,000 mL at 05/05/25 2336    labetalol (Normodyne/Trandate) injection 10 mg  10 mg Q4HRS PRN Dangelo Aden D.O.       Last reviewed on 5/6/2025  1:19 AM by Margaux Pablo R.N.       Current Outpatient Medications:  Medications Prior to Admission   Medication Sig Dispense Refill Last Dose/Taking    CALCIUM PO Take 2 Each by mouth every day. Gummies   5/4/2025 Morning    VITAMIN D PO Take 1 Tablet by mouth every day.   5/4/2025 Morning    NON SPECIFIED Take  by mouth 1 time a day as needed (constipation). 5 teaspoons of olive oil   5/5/2025 Morning    cyclobenzaprine (FLEXERIL) 5 mg tablet Take 1-2 Tablets by mouth every 8 hours as needed for Muscle Spasms. 30 Tablet 0 4/21/2025 Morning    rosuvastatin (CRESTOR) 5 MG Tab Take 1 Tablet by mouth every evening. 90 Tablet 3 4/28/2025 Evening         Medication Allergies:  No Known Allergies      Family Medical History:  Family History   Problem Relation Age of Onset    Hyperlipidemia Mother     No Known Problems Father     Hypertension Brother     Heart Disease Maternal Grandfather 34        MI         Social History:  Social History     Socioeconomic History    Marital status:      Spouse name: Not on file    Number of children: Not on file    Years of education: Not on file    Highest education level: Associate degree: occupational, technical, or vocational program   Occupational History    Not on file   Tobacco Use    Smoking status: Never    Smokeless tobacco: Current     Types: Chew   Vaping Use    Vaping status: Never Used   Substance and Sexual Activity    Alcohol use: Yes     Comment: occ     Drug use: No    Sexual activity: Not on file   Other Topics Concern    Not on file   Social History Narrative    Not on file     Social Drivers of Health     Financial Resource Strain: Low Risk  (5/10/2023)    Overall Financial Resource Strain (CARDIA)     Difficulty of Paying Living Expenses: Not hard at all   Food Insecurity: No Food Insecurity (5/6/2025)    Hunger Vital Sign     Worried About Running Out of Food in the Last Year: Never true     Ran Out of Food in the Last Year: Never true   Transportation Needs: No Transportation Needs (5/6/2025)    PRAPARE - Transportation     Lack of Transportation (Medical): No     Lack of Transportation (Non-Medical): No   Physical Activity: Insufficiently Active (5/10/2023)    Exercise Vital Sign     Days of Exercise per Week: 1 day     Minutes of Exercise per Session: 10 min   Stress: No Stress Concern Present (5/10/2023)    [employer] [place] [employer] - Occupational Stress Questionnaire     Feeling of Stress : Not at all   Social Connections: Moderately Isolated (5/10/2023)    Social Connection and Isolation Panel [NHANES]     Frequency of Communication with Friends and Family: Twice a week     Frequency of Social Gatherings with Friends and Family: Once a week     Attends Synagogue Services: Never     Active Member of Clubs or Organizations: No     Attends Club or Organization Meetings: Never     Marital Status:    Intimate Partner Violence: Not At Risk (5/6/2025)    Humiliation, Afraid, Rape, and Kick questionnaire     Fear of Current or Ex-Partner: No     Emotionally Abused: No     Physically Abused: No     Sexually Abused: No   Housing Stability: Low Risk  (5/6/2025)    Housing Stability Vital Sign     Unable to Pay for Housing in the Last Year: No     Number of Times Moved in the Last Year: 0     Homeless in the Last Year: No         Vital signs:  Weight/BMI: Body mass index is 28.53 kg/m².  BP 96/50   Pulse (!) 50   Temp 36.3 °C (97.3 °F)  "(Temporal)   Resp 16   Ht 1.727 m (5' 8\")   Wt 85.1 kg (187 lb 9.8 oz)   SpO2 91%   Vitals:    05/05/25 2000 05/05/25 2100 05/06/25 0103 05/06/25 0338   BP: 124/84 116/79 122/81 96/50   Pulse: 73 65 (!) 59 (!) 50   Resp: 15 15 16 16   Temp:   36.5 °C (97.7 °F) 36.3 °C (97.3 °F)   TempSrc:   Temporal Temporal   SpO2: 94% 93% 93% 91%   Weight:   85.1 kg (187 lb 9.8 oz)    Height:   1.727 m (5' 8\")      Oxygen Therapy:  Pulse Oximetry: 91 %, O2 (LPM): 0, O2 Delivery Device: None - Room Air    Intake/Output Summary (Last 24 hours) at 5/6/2025 0542  Last data filed at 5/6/2025 0400  Gross per 24 hour   Intake 191.67 ml   Output 150 ml   Net 41.67 ml         Physical Exam:  Physical Exam  Constitutional:       Appearance: Normal appearance.   HENT:      Head: Normocephalic and atraumatic.      Nose: Nose normal.      Mouth/Throat:      Mouth: Mucous membranes are moist.   Eyes:      Pupils: Pupils are equal, round, and reactive to light.   Cardiovascular:      Rate and Rhythm: Regular rhythm.      Heart sounds: Normal heart sounds.   Pulmonary:      Effort: Pulmonary effort is normal.      Breath sounds: Normal breath sounds.   Abdominal:      General: Bowel sounds are normal.      Palpations: Abdomen is soft.   Musculoskeletal:         General: Normal range of motion.      Cervical back: Neck supple.   Skin:     General: Skin is warm and dry.   Neurological:      Mental Status: He is alert and oriented to person, place, and time.                 Labs:  Recent Labs     05/05/25  1850   SODIUM 139   POTASSIUM 4.3   CHLORIDE 104   CO2 21   BUN 21   CREATININE 0.99   MAGNESIUM 2.0   CALCIUM 9.9     Recent Labs     05/05/25  1850   ALTSGPT 37   ASTSGOT 28   ALKPHOSPHAT 64   TBILIRUBIN 0.4   LIPASE 51   GLUCOSE 87     Recent Labs     05/05/25  1850   WBC 6.1   NEUTSPOLYS 59.10   LYMPHOCYTES 27.20   MONOCYTES 7.60   EOSINOPHILS 4.30   BASOPHILS 1.50   ASTSGOT 28   ALTSGPT 37   ALKPHOSPHAT 64   TBILIRUBIN 0.4     Recent Labs "     05/05/25  1850   RBC 5.26   HEMOGLOBIN 16.2   HEMATOCRIT 47.4   PLATELETCT 244     Recent Results (from the past 24 hours)   CBC WITH DIFFERENTIAL    Collection Time: 05/05/25  6:50 PM   Result Value Ref Range    WBC 6.1 4.8 - 10.8 K/uL    RBC 5.26 4.70 - 6.10 M/uL    Hemoglobin 16.2 14.0 - 18.0 g/dL    Hematocrit 47.4 42.0 - 52.0 %    MCV 90.1 81.4 - 97.8 fL    MCH 30.8 27.0 - 33.0 pg    MCHC 34.2 32.3 - 36.5 g/dL    RDW 43.8 35.9 - 50.0 fL    Platelet Count 244 164 - 446 K/uL    MPV 11.2 9.0 - 12.9 fL    Neutrophils-Polys 59.10 44.00 - 72.00 %    Lymphocytes 27.20 22.00 - 41.00 %    Monocytes 7.60 0.00 - 13.40 %    Eosinophils 4.30 0.00 - 6.90 %    Basophils 1.50 0.00 - 1.80 %    Immature Granulocytes 0.30 0.00 - 0.90 %    Nucleated RBC 0.00 0.00 - 0.20 /100 WBC    Neutrophils (Absolute) 3.58 1.82 - 7.42 K/uL    Lymphs (Absolute) 1.65 1.00 - 4.80 K/uL    Monos (Absolute) 0.46 0.00 - 0.85 K/uL    Eos (Absolute) 0.26 0.00 - 0.51 K/uL    Baso (Absolute) 0.09 0.00 - 0.12 K/uL    Immature Granulocytes (abs) 0.02 0.00 - 0.11 K/uL    NRBC (Absolute) 0.00 K/uL   COMP METABOLIC PANEL    Collection Time: 05/05/25  6:50 PM   Result Value Ref Range    Sodium 139 135 - 145 mmol/L    Potassium 4.3 3.6 - 5.5 mmol/L    Chloride 104 96 - 112 mmol/L    Co2 21 20 - 33 mmol/L    Anion Gap 14.0 7.0 - 16.0    Glucose 87 65 - 99 mg/dL    Bun 21 8 - 22 mg/dL    Creatinine 0.99 0.50 - 1.40 mg/dL    Calcium 9.9 8.5 - 10.5 mg/dL    Correct Calcium 9.5 8.5 - 10.5 mg/dL    AST(SGOT) 28 12 - 45 U/L    ALT(SGPT) 37 2 - 50 U/L    Alkaline Phosphatase 64 30 - 99 U/L    Total Bilirubin 0.4 0.1 - 1.5 mg/dL    Albumin 4.5 3.2 - 4.9 g/dL    Total Protein 7.6 6.0 - 8.2 g/dL    Globulin 3.1 1.9 - 3.5 g/dL    A-G Ratio 1.5 g/dL   LIPASE    Collection Time: 05/05/25  6:50 PM   Result Value Ref Range    Lipase 51 11 - 82 U/L   ESTIMATED GFR    Collection Time: 05/05/25  6:50 PM   Result Value Ref Range    GFR (CKD-EPI) 90 >60 mL/min/1.73 m 2    MAGNESIUM    Collection Time: 05/05/25  6:50 PM   Result Value Ref Range    Magnesium 2.0 1.5 - 2.5 mg/dL   URINALYSIS,CULTURE IF INDICATED    Collection Time: 05/06/25  1:10 AM    Specimen: Urine, Clean Catch   Result Value Ref Range    Color Yellow     Character Clear     Specific Gravity >1.045 <1.035    Ph 5.5 5.0 - 8.0    Glucose Negative Negative mg/dL    Ketones Negative Negative mg/dL    Protein Negative Negative mg/dL    Bilirubin Negative Negative    Urobilinogen, Urine 0.2 <=1.0 EU/dL    Nitrite Negative Negative    Leukocyte Esterase Negative Negative    Occult Blood Negative Negative    Micro Urine Req see below          Radiology Review:  CT-RENAL COLIC EVALUATION(A/P W/O)   Final Result         1.  Posterior duodenal bulb wall thickening with ulcer without visualized contrast leak to indicate perforation.   2.  Mild splenomegaly.   3.  Diverticulosis.   4.  Fat-containing bilateral inguinal hernias.   5.  Fat-containing umbilical hernia.   6.  Atherosclerosis.            CT-ABDOMEN-PELVIS WITH   Final Result         1.  Defect in the posterior wall of the duodenal bulb with adjacent hazy fat stranding, appearance most compatible with duodenal ulcer which appears perforating are near perforating, further evaluation with endoscopy recommended.   2.  Hepatomegaly and changes of cirrhosis.   3.  Subcentimeter ill-defined low-density hepatic lesions, indeterminate due to size.   4.  Fat-containing bilateral inguinal hernias.   5.  Small fat-containing umbilical hernia   6.  Atherosclerosis.      These findings were discussed with the patient's clinician, Elias Novak, on 5/5/2025 9:34 PM.            MDM (Data Review):   -Records reviewed and summarized in current documentation  -I personally reviewed and interpreted the laboratory results  -I personally reviewed the radiology images    Assessment/Recommendations:    Impression:  Duodenal ulcer.  No evidence for perforation.  Afebrile, no leukocytosis, no  acute abdomen.  Chronic NSAID use  Chronic hip pain  Hyperlipidemia    Recs:  Recommend he see Ortho for discussion on need for hip replacment or further hip injection    Obviously no aspirin or NSAIDS.  He has been using naproxen, meloxicam and ibuprofen    HIgh dose PPI BID x 8 weeks    GI soft diet    Discussed toilet habits to decrease hemorrhoidal bleeding    I don't feel benefit of EGD outweighs risk of causing perforation with insufflation or precipitating bleeding    He is an established patient of [facility] and should follow up with them for hospital follow up and EGD          Ilene Roberts M.D.          Core Quality Measures   Reviewed items:  Labs, Medications and Radiology reports reviewed

## 2025-05-06 NOTE — PROGRESS NOTES
"  DATE: 5/6/2025    Surgical consult for Duodenal peptic ulcer    INTERVAL EVENTS:  Feeling better.   Tolerating diet.    PHYSICAL EXAMINATION:  Vital Signs: BP 96/50   Pulse (!) 50   Temp 36.3 °C (97.3 °F) (Temporal)   Resp 16   Ht 1.727 m (5' 8\")   Wt 85.1 kg (187 lb 9.8 oz)   SpO2 91%     Awake and alert.   Abdomen soft, non-distended and nontender.      LABORATORY VALUES:  Recent Labs     05/05/25 1850 05/06/25  0502   WBC 6.1 5.1   RBC 5.26 4.68*   HEMOGLOBIN 16.2 14.6   HEMATOCRIT 47.4 42.6   MCV 90.1 91.0   MCH 30.8 31.2   MCHC 34.2 34.3   RDW 43.8 45.9   PLATELETCT 244 204   MPV 11.2 11.1     Recent Labs     05/05/25 1850 05/06/25  0502   SODIUM 139 137   POTASSIUM 4.3 4.3   CHLORIDE 104 104   CO2 21 24   GLUCOSE 87 80   BUN 21 17   CREATININE 0.99 1.01   CALCIUM 9.9 9.2     Recent Labs     05/05/25 1850 05/06/25  0502   ASTSGOT 28 26   ALTSGPT 37 32   TBILIRUBIN 0.4 0.6   ALKPHOSPHAT 64 56   GLOBULIN 3.1 2.5     ASSESSMENT AND PLAN:  55-year-old male with history of significant NSAID use in the setting of right hip arthritis who presents with worsening upper abdominal pain in the setting of approximately 3 months of pain.     Hgb stable. No signs of bleeding.  Agree with GI recommendations.   Follow up with [facility] for outpatient EGD.       ____________________________________     Nara Abarca D.N.P.    DD: 5/6/2025  12:01 PM    "

## 2025-05-06 NOTE — ED TRIAGE NOTES
Darien Konstantin Juan  55 y.o. male    Chief Complaint   Patient presents with    Abdominal Pain     Pt arrives with complaints of R and L upper abd pain that has been ongoing for past week, with nausea, and loose stool for two months. Pt states pain is better with eating. Pt states he has also had some blood in stool. Pt states pain 7/10.     Vitals:    05/05/25 1757   BP: 134/81   Pulse: 72   Resp: 16   Temp: 36.5 °C (97.7 °F)   SpO2: 98%       Triage process explained to patient, apologized for wait time, and returned to lobby.  Pt informed to notify staff of any change in condition.     
Self

## 2025-05-06 NOTE — ASSESSMENT & PLAN NOTE
Noting chronic pain of shoulder, low back, hip in context of chronic NSAID use given new pathology.  -Discontinuing outpatient NSAIDs.  Likely will need other pain management to be determined by outpatient primary care and other providers.

## 2025-05-06 NOTE — DISCHARGE SUMMARY
[facility] Internal Medicine Discharge Summary    Attending: Dr. Rakesh Reyes   Senior Resident: Dr. Carly Gibson   Intern:  Dr. Rafiq Gomez   Contact Number: [phone]    CHIEF COMPLAINT ON ADMISSION  Chief Complaint   Patient presents with    Abdominal Pain       Reason for Admission  Abd Pain     Admission Date  5/5/2025    CODE STATUS  Prior    HPI & HOSPITAL COURSE  The patient is a 55-year-old male with a history of arthritis of the right hip (previously taking meloxicam and ibuprofen) who presents with an approximately 3-month history of upper abdominal pain that has worsened over the last week.  The pain was markedly improved with eating but became more severe over the past 3 days  and he could not longer bear this pain and decided to come into the emergency department     In the emergency department labs were grossly normal including no leukocytosis no LFT elevations no fever no tachycardia.  By that time patient's symptoms had markedly improved. CT of the abdomen and pelvis with IV contrast was notable for a intraluminal defect of the posterior aspect of the duodenal bulb with some possible periduodenal fat stranding which was interpreted as concerning for possible perforation.  Follow-up CT renal colic protocol revealed posterior duodenal bulb wall thickening with ulcer without visualized contrast leak to indicate perforation.  Patient was admitted for observation and remained quite stable with no rebound, guarding, melena, hematemesis, vital instability, decrease in hemoglobin and was discharged with a plan to start on a proton pump inhibitor particularly omeprazole 40 mg twice a day for 8 weeks.  Patient was also started on maintenance fluid and made n.p.o. overnight. Patient was also recommended to follow-up with gastroenterology in the outpatient setting to schedule an EGD in approximately 8 weeks to biopsy the ulcer and monitor for resolution.  Gastroenterology and patient felt that a scope at this time would  risk perforation and was not indicated given stable clinical status.  Patient was also instructed to continue following with orthopedic surgery for shots which had previously helped him.  Patient also a social binge drinker but this has not happened recently.  Patient with a history of chewing tobacco use did not want to start Chantix at this time and is not ready to quit       Therefore, he is discharged in good and stable condition to home with close outpatient follow-up.    The patient recovered much more quickly than anticipated on admission.    Discharge Date  5/6/2025    Physical Exam on Day of Discharge  Physical Exam  Vitals reviewed.   Constitutional:       Appearance: Normal appearance.   HENT:      Head: Normocephalic and atraumatic.      Nose: Nose normal.      Mouth/Throat:      Mouth: Mucous membranes are moist.      Pharynx: Oropharynx is clear.   Eyes:      Extraocular Movements: Extraocular movements intact.      Conjunctiva/sclera: Conjunctivae normal.      Pupils: Pupils are equal, round, and reactive to light.   Cardiovascular:      Rate and Rhythm: Normal rate and regular rhythm.      Pulses: Normal pulses.      Heart sounds: Normal heart sounds. No murmur heard.  Pulmonary:      Effort: Pulmonary effort is normal.      Breath sounds: Normal breath sounds. No wheezing, rhonchi or rales.   Abdominal:      General: Abdomen is flat. Bowel sounds are normal. There is no distension.      Palpations: Abdomen is soft. There is no mass.      Tenderness: There is no abdominal tenderness. There is no guarding or rebound.   Musculoskeletal:         General: Normal range of motion.      Right lower leg: No edema.      Left lower leg: No edema.   Skin:     General: Skin is warm and dry.      Capillary Refill: Capillary refill takes less than 2 seconds.      Findings: No lesion or rash.   Neurological:      General: No focal deficit present.      Mental Status: He is alert and oriented to person, place, and  time. Mental status is at baseline.      Cranial Nerves: No cranial nerve deficit.      Motor: No weakness.   Psychiatric:         Mood and Affect: Mood normal.         Behavior: Behavior normal.         Thought Content: Thought content normal.         Judgment: Judgment normal.         FOLLOW UP ITEMS POST DISCHARGE  -Please avoid NSAID Usage   -Please see GI for EGD in 8 weeks   -Please use your acid suppressing medication twice a day for 8 weeks  -Please see GI and primary care for your liver lesions that were found incidentally on CT imaging.    DISCHARGE DIAGNOSES  Principal Problem:    Duodenal ulcer (POA: Yes)  Active Problems:    Mixed hyperlipidemia (POA: Yes)    Chewing tobacco use (POA: Yes)    Other chronic pain (POA: Yes)    Lesion of liver (POA: Unknown)  Resolved Problems:    Chronic pain of both hips (POA: Yes)      FOLLOW UP  No future appointments.  Maggy Painter, A.P.R.N.  [address] [address] [address] [phone]    Schedule an appointment as soon as possible for a visit in 2 week(s)      [facility]  [address] [place] [address] [phone]  Schedule an appointment as soon as possible for a visit in 1 week(s)        MEDICATIONS ON DISCHARGE     Medication List        START taking these medications        Instructions   omeprazole 40 MG delayed-release capsule  Commonly known as: PriLOSEC   Take 1 Capsule by mouth 2 times a day.  Dose: 40 mg            CONTINUE taking these medications        Instructions   CALCIUM PO   Take 2 Each by mouth every day. Gummies  Dose: 2 Each     cyclobenzaprine 5 MG tablet  Commonly known as: Flexeril   Take 1-2 Tablets by mouth every 8 hours as needed for Muscle Spasms.  Dose: 5-10 mg     NON SPECIFIED   Take  by mouth 1 time a day as needed (constipation). 5 teaspoons of olive oil     rosuvastatin 5 MG Tabs  Commonly known as: Crestor   Take 1 Tablet by mouth every evening.  Dose: 5 mg     VITAMIN D PO    Take 1 Tablet by mouth every day.  Dose: 1 Tablet              Allergies  No Known Allergies    DIET  No orders of the defined types were placed in this encounter.      ACTIVITY  As tolerated.  Weight bearing as tolerated    CONSULTATIONS  Gastroenterology     PROCEDURES  None    LABORATORY  Lab Results   Component Value Date    SODIUM 137 05/06/2025    POTASSIUM 4.3 05/06/2025    CHLORIDE 104 05/06/2025    CO2 24 05/06/2025    GLUCOSE 80 05/06/2025    BUN 17 05/06/2025    CREATININE 1.01 05/06/2025        Lab Results   Component Value Date    WBC 5.1 05/06/2025    HEMOGLOBIN 14.6 05/06/2025    HEMATOCRIT 42.6 05/06/2025    PLATELETCT 204 05/06/2025        Total time of the discharge process exceeds 45 minutes.

## 2025-05-06 NOTE — ASSESSMENT & PLAN NOTE
Noting chewing tobacco history approximately 2 cans/week for approximately 30 years.   - Encouraging cessation.

## 2025-05-06 NOTE — PROGRESS NOTES
4 Eyes Skin Assessment Completed by MIRIAM Damico and MIRIAM Weber.    Head WDL  Ears WDL  Nose WDL  Mouth WDL  Neck WDL  Breast/Chest WDL  Shoulder Blades WDL  Spine WDL  (R) Arm/Elbow/Hand WDL  (L) Arm/Elbow/Hand WDL  Abdomen WDL  Groin WDL  Scrotum/Coccyx/Buttocks WDL  (R) Leg WDL  (L) Leg WDL  (R) Heel/Foot/Toe WDL  (L) Heel/Foot/Toe WDL          Devices In Places Blood Pressure Cuff and Pulse Ox      Interventions In Place Pillows    Possible Skin Injury No    Pictures Uploaded Into Epic N/A  Wound Consult Placed N/A  RN Wound Prevention Protocol Ordered No

## 2025-05-06 NOTE — ED PROVIDER NOTES
ED Provider Note    CHIEF COMPLAINT  Chief Complaint   Patient presents with    Abdominal Pain         EXTERNAL RECORDS REVIEWED  Clinic note from September 27 of last fall reviewed for right hip osteoarthritis.  He received an injection in the hip.    He has never had abdominal imaging in our system.    HPI    Darien Martinez is a 55 y.o. male who presents to the Emergency Department with 3 months of bilateral upper quadrant abdominal pain.  The pain seems to start in the right upper quadrant very far laterally along the rib margin and then radiates across the top of the abdomen.  Sometimes eating makes it better.  He has not noticed fatty food intolerance.  Denies fever.  The last 3 days he has had nearly constant symptoms and has had nausea but there is been no vomiting diarrhea or definite constipation.  No dysuria urgency frequency hematuria or kidney stones.  No surgical history of the abdomen.  He does use ibuprofen most days.  No gastritis or ulcer history.  No PPI use.  No diabetes or immune compromise.  N.p.o. since noon.    LIMITATION TO HISTORY   None    OUTSIDE HISTORIAN(S):  His wife provided history that she is not ill and there are no other ill contacts    REVIEW OF SYSTEMS  Pertinent positives include: Abdominal pain nausea.  Pertinent negatives include: Chest pain.      PAST MEDICAL HISTORY  Past Medical History:   Diagnosis Date    Hyperlipidemia        FAMILY HISTORY  Family History   Problem Relation Age of Onset    Hyperlipidemia Mother     No Known Problems Father     Hypertension Brother     Heart Disease Maternal Grandfather 34        MI       SOCIAL HISTORY  Social History     Tobacco Use    Smoking status: Never    Smokeless tobacco: Current     Types: Chew   Vaping Use    Vaping status: Never Used   Substance Use Topics    Alcohol use: Yes     Comment: occ    Drug use: No     Social History     Substance and Sexual Activity   Drug Use No       SURGICAL HISTORY  Past Surgical History:  "  Procedure Laterality Date    TYMPANOPLASTY      TYMPANOTOMY         CURRENT MEDICATIONS  No current facility-administered medications for this encounter.    Current Outpatient Medications:     meloxicam (MOBIC) 15 MG tablet, TAKE 1 TABLET BY MOUTH EVERY DAY, Disp: 30 Tablet, Rfl: 0    cyclobenzaprine (FLEXERIL) 5 mg tablet, Take 1-2 Tablets by mouth every 8 hours as needed for Muscle Spasms., Disp: 30 Tablet, Rfl: 0    rosuvastatin (CRESTOR) 5 MG Tab, Take 1 Tablet by mouth every evening., Disp: 90 Tablet, Rfl: 3    ALLERGIES  No Known Allergies    PHYSICAL EXAM  VITAL SIGNS: /81   Pulse 72   Temp 36.5 °C (97.7 °F) (Temporal)   Resp 16   Ht 1.727 m (5' 8\")   Wt 85 kg (187 lb 6.3 oz)   SpO2 98%   BMI 28.49 kg/m²   Reviewed and afebrile high normal blood pressure  Constitutional: Well developed, Well nourished, well appearing slightly elevated BMI.  HENT: Normocephalic, atraumatic, bilateral external ears normal, No intraoral erythema, edema, exudate  Eyes: PERRLA, conjunctiva pink, no scleral icterus.   Cardiovascular: Regular rate and rhythm. No murmurs, rubs or gallops.  No dependent edema or calf tenderness  Respiratory: Lungs clear to auscultation bilaterally. No wheezes, rales, or rhonchi.  Abdominal:  Abdomen soft, mild very lateral right upper quadrant tenderness.  Mild upper abdominal tenderness.  No rebound or guarding.  Hicks sign negative.  No McBurney's point tenderness no pulsatile mass.  Non distended. No rebound, or guarding.    Skin: No erythema, no rash. No wounds or bruising.  Genitourinary: No costovertebral angle tenderness.   Musculoskeletal: no deformities.   Neurologic: Alert & oriented x 3, cranial nerves 2-12 intact by passive exam.  Moves 4 limbs with symmetric strength.  Psychiatric: Affect normal, Judgment normal, Mood normal.     LABS Ordered and Reviewed by Me:  Results for orders placed or performed during the hospital encounter of 05/05/25   CBC WITH DIFFERENTIAL    " Collection Time: 05/05/25  6:50 PM   Result Value Ref Range    WBC 6.1 4.8 - 10.8 K/uL    RBC 5.26 4.70 - 6.10 M/uL    Hemoglobin 16.2 14.0 - 18.0 g/dL    Hematocrit 47.4 42.0 - 52.0 %    MCV 90.1 81.4 - 97.8 fL    MCH 30.8 27.0 - 33.0 pg    MCHC 34.2 32.3 - 36.5 g/dL    RDW 43.8 35.9 - 50.0 fL    Platelet Count 244 164 - 446 K/uL    MPV 11.2 9.0 - 12.9 fL    Neutrophils-Polys 59.10 44.00 - 72.00 %    Lymphocytes 27.20 22.00 - 41.00 %    Monocytes 7.60 0.00 - 13.40 %    Eosinophils 4.30 0.00 - 6.90 %    Basophils 1.50 0.00 - 1.80 %    Immature Granulocytes 0.30 0.00 - 0.90 %    Nucleated RBC 0.00 0.00 - 0.20 /100 WBC    Neutrophils (Absolute) 3.58 1.82 - 7.42 K/uL    Lymphs (Absolute) 1.65 1.00 - 4.80 K/uL    Monos (Absolute) 0.46 0.00 - 0.85 K/uL    Eos (Absolute) 0.26 0.00 - 0.51 K/uL    Baso (Absolute) 0.09 0.00 - 0.12 K/uL    Immature Granulocytes (abs) 0.02 0.00 - 0.11 K/uL    NRBC (Absolute) 0.00 K/uL   COMP METABOLIC PANEL    Collection Time: 05/05/25  6:50 PM   Result Value Ref Range    Sodium 139 135 - 145 mmol/L    Potassium 4.3 3.6 - 5.5 mmol/L    Chloride 104 96 - 112 mmol/L    Co2 21 20 - 33 mmol/L    Anion Gap 14.0 7.0 - 16.0    Glucose 87 65 - 99 mg/dL    Bun 21 8 - 22 mg/dL    Creatinine 0.99 0.50 - 1.40 mg/dL    Calcium 9.9 8.5 - 10.5 mg/dL    Correct Calcium 9.5 8.5 - 10.5 mg/dL    AST(SGOT) 28 12 - 45 U/L    ALT(SGPT) 37 2 - 50 U/L    Alkaline Phosphatase 64 30 - 99 U/L    Total Bilirubin 0.4 0.1 - 1.5 mg/dL    Albumin 4.5 3.2 - 4.9 g/dL    Total Protein 7.6 6.0 - 8.2 g/dL    Globulin 3.1 1.9 - 3.5 g/dL    A-G Ratio 1.5 g/dL   LIPASE    Collection Time: 05/05/25  6:50 PM   Result Value Ref Range    Lipase 51 11 - 82 U/L   ESTIMATED GFR    Collection Time: 05/05/25  6:50 PM   Result Value Ref Range    GFR (CKD-EPI) 90 >60 mL/min/1.73 m 2       Interpretations:      RADIOLOGY  I have independently interpreted the CT abdomen and pelvis associated with this visit demonstrating possibly abnormal  duodenum.  I am awaiting the final reading from the radiologist.     Final Radiology Report  CT-ABDOMEN-PELVIS WITH   Final Result         1.  Defect in the posterior wall of the duodenal bulb with adjacent hazy fat stranding, appearance most compatible with duodenal ulcer which appears perforating are near perforating, further evaluation with endoscopy recommended.   2.  Hepatomegaly and changes of cirrhosis.   3.  Subcentimeter ill-defined low-density hepatic lesions, indeterminate due to size.   4.  Fat-containing bilateral inguinal hernias.   5.  Small fat-containing umbilical hernia   6.  Atherosclerosis.      These findings were discussed with the patient's clinician, Elias Novak, on 5/5/2025 9:34 PM.      CT-RENAL COLIC EVALUATION(A/P W/O)    (Results Pending)     Radiologist interpretation have been reviewed by me.     ED COURSE:      INTERVENTIONS BY ME:  Protonix 80 mg IV  Normal saline 125 cc an hour  Patient made n.p.o.    ASSESSMENT, COURSE AND PLAN:  PROBLEMS EVALUATED THIS VISIT:    This patient presents with 3 months of abdominal pain which worsened over the last 3 days.  He is taken ibuprofen most days.  He appears to have a duodenal ulcer at risk for perforation according to the radiologist.  He will require surgical and GI consultation and probable endoscopy.  CT with oral contrast ordered at the request of general surgery with whom I discussed the case.  Patient will be admitted by the hospitalist.      DISPOSITION AND DISCUSSIONS    I have discussed management of the patient with the following physicians and sources:   Dr. Roberts, [facility] resident, General Surgery    RISK:  High given need for escalation of care to include admission for possible major procedure     PLAN:  IV fluids PPI sucralfate GI and general surgery consultation probable endoscopy    CONDITION: Fair.     FINAL IMPRESSION  1. Duodenal ulcer    2. NSAID long-term use         Elias Novak M.D., 05/05/25 12:09 AM

## 2025-05-06 NOTE — H&P
[facility] Internal Medicine History & Physical Note    Date of Service  5/6/2025    Attending: Cleve White D.o.  Senior Resident: Dr. Dangelo Aden  Contact Number: [phone]    Primary Care Physician  JERO Marie.    Consultants  general surgery and GI    Code Status  Full Code    Chief Complaint  Chief Complaint   Patient presents with    Abdominal Pain       History of Presenting Illness (HPI):   Darien Martinez is a 55 y.o. male who presented 5/5/2025 with duodenal ulcer suspicious of perforation.  He is a past medical history of hyperlipidemia, chronic pain especially of the shoulder, right hip, low back for which the patient takes chronic NSAIDs of meloxicam and ibuprofen.  He notes worsening belly pain especially associated with food for approximately last 3 months.  He states that he was represcribed meloxicam approximately 6 months ago, for which he only takes intermittently but switches with approximately 600 mg of ibuprofen when he does not take these.  He stated with his new abdominal pain he has also noticed changes in bloating, bowel movements including constipation and diarrhea and also blood in the stool and surrounding the stool.  He stated that approximately 1 week ago he noticed that his abdominal pain worsened significantly, in addition he started having worsening volumes of blood in the stool including spraying.  Patient denies other review of systems such as chest pain, shortness of breath, cough, fever, changes in bladder including dysuria, increased frequency or decreased stream.  ER course is significant showing normal vitals and labs all being within normal limits.  However CT abdomen pelvis did show significant abnormalities of defect within the posterior wall of the duodenal bulb with adjacent retroperitoneal fat stranding consistent with duodenal ulcer which appears to be perforating or near perforating.  ER contacted both GI and general surgery who will assess this  patient and coordinate care between their services how best to manage.    I discussed the plan of care with patient and family.    Review of Systems  Review of Systems   Constitutional:  Negative for chills, fever and malaise/fatigue.   Respiratory:  Negative for cough, hemoptysis, sputum production and shortness of breath.    Cardiovascular:  Negative for chest pain, palpitations and leg swelling.   Gastrointestinal:  Positive for abdominal pain, blood in stool, constipation and diarrhea. Negative for heartburn, melena and nausea.   Genitourinary:  Negative for dysuria, frequency and urgency.   Musculoskeletal:  Positive for back pain and joint pain.   Neurological:  Negative for dizziness and headaches.   Psychiatric/Behavioral:  Negative for substance abuse.        Past Medical History   has a past medical history of Hyperlipidemia.    Surgical History   has a past surgical history that includes tympanoplasty and tympanotomy.     Family History  family history includes Heart Disease (age of onset: 34) in his maternal grandfather; Hyperlipidemia in his mother; Hypertension in his brother; No Known Problems in his father.   Family history reviewed with patient.     Social History  Tobacco: Chews approximately 2 cans/week for approximately 30 years  Alcohol: Denies daily use.  But does admit binging 6+ drinks per event has social events such as the Super Bowl, the [employer], approximately 1 time per month  Recreational drugs (illegal or prescription): Reports some mushroom use.  Denies other recreational drugs  Employment: Works as an  for the [employer]      Allergies  No Known Allergies    Medications  Prior to Admission Medications   Prescriptions Last Dose Informant Patient Reported? Taking?   cyclobenzaprine (FLEXERIL) 5 mg tablet   No No   Sig: Take 1-2 Tablets by mouth every 8 hours as needed for Muscle Spasms.   meloxicam (MOBIC) 15 MG tablet   No No   Sig: TAKE 1 TABLET BY MOUTH EVERY DAY    rosuvastatin (CRESTOR) 5 MG Tab   No No   Sig: Take 1 Tablet by mouth every evening.      Facility-Administered Medications: None       Physical Exam  Temp:  [36.5 °C (97.7 °F)] 36.5 °C (97.7 °F)  Pulse:  [65-73] 65  Resp:  [15-16] 15  BP: (116-136)/(79-89) 116/79  SpO2:  [93 %-98 %] 93 %  Blood Pressure: 116/79   Temperature: 36.5 °C (97.7 °F)   Pulse: 65   Respiration: 15   Pulse Oximetry: 93 %       Physical Exam  Vitals and nursing note reviewed.   Constitutional:       General: He is not in acute distress.     Appearance: Normal appearance. He is normal weight. He is not ill-appearing.   HENT:      Head: Normocephalic and atraumatic.      Right Ear: External ear normal.      Left Ear: External ear normal.   Cardiovascular:      Rate and Rhythm: Normal rate and regular rhythm.      Pulses: Normal pulses.      Heart sounds: Normal heart sounds. No murmur heard.  Pulmonary:      Effort: Pulmonary effort is normal. No respiratory distress.   Abdominal:      General: Abdomen is flat. Bowel sounds are normal. There is no distension.      Palpations: Abdomen is soft. There is no fluid wave or mass.      Tenderness: There is abdominal tenderness (mild). There is no guarding or rebound. Negative signs include Hicks's sign, Rovsing's sign, McBurney's sign and psoas sign.   Musculoskeletal:         General: No swelling, deformity or signs of injury.      Right lower leg: No edema.      Left lower leg: No edema.   Skin:     Coloration: Skin is not jaundiced or pale.      Findings: No bruising or erythema.   Neurological:      General: No focal deficit present.      Mental Status: He is alert and oriented to person, place, and time. Mental status is at baseline.   Psychiatric:         Mood and Affect: Mood normal.         Behavior: Behavior normal.         Thought Content: Thought content normal.         Judgment: Judgment normal.         Laboratory:  Recent Labs     05/05/25  1850   WBC 6.1   RBC 5.26   HEMOGLOBIN 16.2  "  HEMATOCRIT 47.4   MCV 90.1   MCH 30.8   MCHC 34.2   RDW 43.8   PLATELETCT 244   MPV 11.2     Recent Labs     05/05/25  1850   SODIUM 139   POTASSIUM 4.3   CHLORIDE 104   CO2 21   GLUCOSE 87   BUN 21   CREATININE 0.99   CALCIUM 9.9     Recent Labs     05/05/25  1850   ALTSGPT 37   ASTSGOT 28   ALKPHOSPHAT 64   TBILIRUBIN 0.4   LIPASE 51   GLUCOSE 87         No results for input(s): \"NTPROBNP\" in the last 72 hours.      No results for input(s): \"TROPONINT\" in the last 72 hours.    Imaging:  CT-ABDOMEN-PELVIS WITH   Final Result         1.  Defect in the posterior wall of the duodenal bulb with adjacent hazy fat stranding, appearance most compatible with duodenal ulcer which appears perforating are near perforating, further evaluation with endoscopy recommended.   2.  Hepatomegaly and changes of cirrhosis.   3.  Subcentimeter ill-defined low-density hepatic lesions, indeterminate due to size.   4.  Fat-containing bilateral inguinal hernias.   5.  Small fat-containing umbilical hernia   6.  Atherosclerosis.      These findings were discussed with the patient's clinician, Elias Novak, on 5/5/2025 9:34 PM.      CT-RENAL COLIC EVALUATION(A/P W/O)    (Results Pending)       no X-Ray or EKG requiring interpretation    Assessment/Plan:  Problem Representation:  Darien Jauregui is a 55-year-old male presenting duodenal ulcer suspicious of perforation in context of chronic NSAIDs use.  GI and general surgery have been contacted from the emergency room which will be coordinating care regarding procedures later during this hospitalization.  I anticipate this patient will require at least two midnights for appropriate medical management, necessitating inpatient admission because stools are ulcer requiring both GI and general surgery possible involvement and management.    Patient will need a Med/Surg bed on MEDICAL service .  The need is secondary to duodenal ulcer suspicious of perforation.    * Duodenal ulcer- (present on " admission)  Assessment & Plan  Patient with history of chronic NSAID use of meloxicam, ibuprofen intermittently, socially binge drinks proximately 6+ drinks once per month.  CT scan showing significant duodenal ulcer with wall defect suspicious of perforation.  -GI and general surgery consulted from emergency room; appreciate recommendations.  Coordinating care regarding upcoming procedures  -Adding pantoprazole IV 40 mg twice daily, sucrafate.  Discussed likely long-term course upon discharge.  -Adding maintenance fluids overnight.  -Strict NPO.  - Adding morphine IV as needed doses  - Pending CT colic evaluation per specialist recommendations.    Other chronic pain- (present on admission)  Assessment & Plan  Noting chronic pain of shoulder, low back, hip in context of chronic NSAID use given new pathology.  -Discontinuing outpatient NSAIDs.  Likely will need other pain management to be determined by outpatient primary care and other providers.    Chewing tobacco use- (present on admission)  Assessment & Plan  Noting chewing tobacco history approximately 2 cans/week for approximately 30 years.   - Encouraging cessation.    Mixed hyperlipidemia- (present on admission)  Assessment & Plan  Continuing home rosuvastatin.        VTE prophylaxis: SCDs/TEDs and pharmacologic prophylaxis contraindicated due to duodenal ulcer/perforation

## 2025-05-06 NOTE — ED NOTES
Med Rec completed per patient      Allergies reviewed: yes     Oral antibiotics in the past 30 days: no    Anticoagulant in past 14 days: no    Dispense history available in EPIC: yes     Pharmacy patient utilizes: [facility] [address]  [phone]

## 2025-05-06 NOTE — NON-PROVIDER
Progress Note:     Date of Service: 5/6/2025  Primary Team: [employer] [facility] Purple Team   Attending: Dr. Rakesh Reyes   Senior Resident: Dr. Carly Gibson  Med Student: Noah Tamayo, MS3  Contact:  [phone]    Consultants/Specialty:  GI, General surgery    SUBJECTIVE  Presented with ab pain 8/10 that did not respond to NSAIDs, rest, or stretching. Has chronic right hip pain that radiates up his back and down his leg. Has had recent hx of bloating, diah., and contipation with some visible blood. No chest pain, SOB, cough, or fever. Patient was well appearing with mild ab tenderness in RUQ. Pain returned about 30 minutes ago 5/10     REVIEW OF SYSTEMS  Constitutional    Negative for chills, fever   HENT    Negative for hearing changes or loss   Eyes    Negative for vision changes   Respiratory    Negative for cough or hemoptysis   Cardiovascular    Negative for chest pain or palpitations   Gastrointestinal    Negative for N/V.     Positive for bloating, diarrhea and constipation    Genitourinary    Positive for flank pain.     Negative for increased frequency   Musculoskeletal    Positive for joint pain, lower back, hip pain.     Negative for diffuse tenderness, swelling   Skin    Negative for rash   Neurological    Negative for tremors, headache, or focal weakness   Endo/Heme/Allergies       Psychiatric/Behavioral         OBJECTIVE  ROS + abdominal pain, blood in stool, constipation and diarrhea, back & joint pain. Otherwise negative    VITALS   Temp:  97.3  Pulse:  [50-73] 50  Resp:  16  BP: ()/(50-89) 96/50  SpO2:  91 %      PHYSICAL EXAM   Constitutional  General: He is not in acute distress   Appearance: Well appearing     HENT  Head: Normocephalic and atraumatic   Nose: Nose normal   Mouth/Throat:    Mouth: Mucous membranes are moist    Eyes  Extraocular Movements: Extraocular movements intact.    Pupils:      Cardiovascular  Rate and Rhythm: RRR    Pulmonary  Effort: Pulmonary effort is normal.    Breath sounds:  Clear     Abdominal  General: Soft, non-distended   Tenderness: Mild pain to deep palpation in LUQ    Musculoskeletal  General: No deformity. Normal range of motion.    Cervical back: Normal range of motion    Skin  General: Skin is warm and dry     Neurological  General: No focal deficit present.    Mental Status: A&Ox4    Psychiatric  Mood and Affect: Normal.   Behavior: Behavior normal       LABS  HGB, WBC, and all other lab values within normal range.    IMAGING  CT ABDOMEN/PELVIS - 5/5/25 @ 21:23  IMPRESSION:  1.  Defect in the posterior wall of the duodenal bulb with adjacent hazy fat stranding, appearance most compatible with duodenal ulcer which appears perforating are near perforating, further evaluation with endoscopy recommended.  2.  Hepatomegaly and changes of cirrhosis.  3.  Subcentimeter ill-defined low-density hepatic lesions, indeterminate due to size.  4.  Fat-containing bilateral inguinal hernias.  5.  Small fat-containing umbilical hernia  6.  Atherosclerosis.    ECT-RENAL COLIC EVALUATION(A/P W/O) 5/6/25 @ 00:45  IMPRESSION:  1.  Posterior duodenal bulb wall thickening with ulcer without visualized contrast leak to indicate perforation.  2.  Mild splenomegaly.  3.  Diverticulosis.  4.  Fat-containing bilateral inguinal hernias.  5.  Fat-containing umbilical hernia.  6.  Atherosclerosis.    ASSESSMENT AND PLAN  #Duodenal Ulcer    Long history of NSAID use   Social binge drinker, +6 drinks at once, on occasion   Normal HGB, no melena - Plan outpatient EGD   GI & Gen Surg. consulted  PLAN        Pantoprazole IV 40mg twice daily       Maintenance fluids       NPO overnight in case of surgery       IV Morphine as needed    #Other chronic pain   Chronic NSAID use for back, hip and shoulder pain  PLAN       Discontinue NSAIDs    #Hyperlipidemia  PLAN       Continue outpatient rosuvastatin    Code Status: Full Code    Noah Tamayo, MS3  Internal Medicine

## 2025-05-06 NOTE — DISCHARGE PLANNING
Patient status updated to observation status per attending MD   determination Rakesh Reyes and UR committee MD secondary review   Cristóbal Thompson. Patient Status Update completed.

## 2025-05-06 NOTE — CARE PLAN
The patient is Stable - Low risk of patient condition declining or worsening    Shift Goals  Clinical Goals: Strict NPO, monitor labs/vital signs, safety  Patient Goals: Pain management, sleep  Family Goals: MIC    Progress made toward(s) clinical / shift goals:      Problem: Pain - Standard  Goal: Alleviation of pain or a reduction in pain to the patient’s comfort goal  Note: Pt pain assessed using appropriate pain scale. Education given on nonpharmacologic comfort measures. Pain management medications administered as ordered and reassessed per policy. Pain management plan developed in collaboration with patient and interdisciplinary team.    Outcome: Progressing  Note: Pt pain assessed using appropriate pain scale. Education given on nonpharmacologic comfort measures. Pain management medications administered as ordered and reassessed per policy. Pain management plan developed in collaboration with patient and interdisciplinary team.       Problem: Knowledge Deficit - Standard  Goal: Patient and family/care givers will demonstrate understanding of plan of care, disease process/condition, diagnostic tests and medications  Outcome: Progressing  Note: Pt and family oriented to the unit, equipment, and policies. Learning assessment completed. Education provided on all medications, treatments, and plan of care. Pt verbalized understanding of education.       Problem: Bowel/Gastric:  Goal: Normal bowel function is maintained or improved  Outcome: Progressing  Note: Pt educated on diet, fluid intake, medications, and activity to promote bowel function. Pt also educated on the signs and symptoms of constipation and interventions to implement. Interdisciplinary team collaboration for optimal bowel function. Pt ordered strict NPO in anticipation of endoscopy on 5/6.

## 2025-05-07 NOTE — Clinical Note
REFERRAL APPROVAL NOTICE         Sent on May 7, 2025                   Darien Martinez  [address]  [facility] [address]                   Dear Mr. Martinez,    After a careful review of the medical information and benefit coverage, [facility] has processed your referral. See below for additional details.    If applicable, you must be actively enrolled with your insurance for coverage of the authorized service. If you have any questions regarding your coverage, please contact your insurance directly.    REFERRAL INFORMATION   Referral #:  [id number]  Referred-To Provider    Referred-By Provider:  Gastroenterology    Rafiq Gomez M.D.   [facility]      [address]   [place] [address]  [phone] [address]  [place] [id number] [phone]    Referral Start Date:  05/06/2025  Referral End Date:   05/06/2026             SCHEDULING  If you do not already have an appointment, please call [phone] to make an appointment.     MORE INFORMATION  If you do not already have a [employer] account, sign up at: [email or web address]  You can access your medical information, make appointments, see lab results, billing information, and more.  If you have questions regarding this referral, please contact  the [facility] Referrals department at:             [phone]. Monday - Friday 8:00AM - 5:00PM.     Sincerely,    [facility]

## 2025-05-08 ENCOUNTER — TELEPHONE (OUTPATIENT)
Dept: PHYSICAL MEDICINE AND REHAB | Facility: MEDICAL CENTER | Age: 55
End: 2025-05-08
Payer: COMMERCIAL

## 2025-05-08 NOTE — TELEPHONE ENCOUNTER
Phone Number Called:     Call outcome: Left detailed message for patient. Informed to call back with any additional questions.    Message: Pt called requesting appt and hip injection with Dr. Fair. Called pt/LM requesting call back.

## 2025-05-15 NOTE — PROGRESS NOTES
Verbal consent was acquired by the patient to use [employer] ambient listening note generation during this visit Yes     FOLLOW-UP PATIENT VISIT    Interventional Spine and Pain  Physiatry (Physical Medicine and Rehabilitation)     Date of Service: 25   Chief Complaint:   Chief Complaint   Patient presents with    Follow-Up     Would inj from last time       Patient Name: Darien Martinez   : 1970   MRN: [id number]       PRIOR HISTORY    Please see new patient note by Dr. Fair for more details.     Interval History  Patient identification: Darien Martinez,  1970, who presents today for follow-up of right hip and back pain.    History of Present Illness  The patient is a 55-year-old male who presents today for follow-up of right back, hip, and groin pain. He reports that the injection administered in September provided temporary relief, with symptoms returning after 3 to 4 months. Initially, he experienced return of discomfort in the right groin area, which subsequently shifted towards the back right hip and sciatic nerve, accompanied by numbness extending down the right lower leg to the ankles. His primary care physician prescribed meloxicam in February or March when he started experiencing pain. A CT scan revealed a duodenal ulcer on the verge of perforation. He was advised to discontinue meloxicam and switch to Tylenol. He has been managing his pain with Tylenol for the past 4 to 5 days which has not provided significant benefit. He has been advised to receive injections every 3 to 4 months until a decision is made regarding hip replacement. He recalls an MRI of his back conducted in  or , which revealed degenerative disc disease. He has been using a massage gun for relief and has noticed that applying pressure with the roller or massage gun relaxes the nerve. He has been taking cyclobenzaprine 10 mg, which induces grogginess, and so only takes this at nighttime. He has been  using an inversion table for relief.    Procedures:  9/27/24: Right hip intraarticular steroid injection, 80-90% improvement for approximately 3-4 months      PMHx:   Past Medical History[1]    PSHx:   Past Surgical History[2]    Family history   Family History   Problem Relation Age of Onset    Hyperlipidemia Mother     No Known Problems Father     Hypertension Brother     Heart Disease Maternal Grandfather 34        MI       Medications:   Active Medications[3]     Medications Ordered Prior to Encounter[4]    Allergies:   Allergies[5]    Social Hx:   Social History     Socioeconomic History    Marital status:      Spouse name: Not on file    Number of children: Not on file    Years of education: Not on file    Highest education level: Associate degree: occupational, technical, or vocational program   Occupational History    Not on file   Tobacco Use    Smoking status: Never    Smokeless tobacco: Current     Types: Chew   Vaping Use    Vaping status: Never Used   Substance and Sexual Activity    Alcohol use: Yes     Comment: occ    Drug use: No    Sexual activity: Not on file   Other Topics Concern    Not on file   Social History Narrative    Not on file     Social Drivers of Health     Financial Resource Strain: Low Risk  (5/10/2023)    Overall Financial Resource Strain (CARDIA)     Difficulty of Paying Living Expenses: Not hard at all   Food Insecurity: No Food Insecurity (5/6/2025)    Hunger Vital Sign     Worried About Running Out of Food in the Last Year: Never true     Ran Out of Food in the Last Year: Never true   Transportation Needs: No Transportation Needs (5/6/2025)    PRAPARE - Transportation     Lack of Transportation (Medical): No     Lack of Transportation (Non-Medical): No   Physical Activity: Insufficiently Active (5/10/2023)    Exercise Vital Sign     Days of Exercise per Week: 1 day     Minutes of Exercise per Session: 10 min   Stress: No Stress Concern Present (5/10/2023)    [place]  "[employer] - Occupational Stress Questionnaire     Feeling of Stress : Not at all   Social Connections: Moderately Isolated (5/10/2023)    Social Connection and Isolation Panel [NHANES]     Frequency of Communication with Friends and Family: Twice a week     Frequency of Social Gatherings with Friends and Family: Once a week     Attends Roman [employer] Services: Never     Active Member of Clubs or Organizations: No     Attends Club or Organization Meetings: Never     Marital Status:    Intimate Partner Violence: Not At Risk (5/6/2025)    Humiliation, Afraid, Rape, and Kick questionnaire     Fear of Current or Ex-Partner: No     Emotionally Abused: No     Physically Abused: No     Sexually Abused: No   Housing Stability: Low Risk  (5/6/2025)    Housing Stability Vital Sign     Unable to Pay for Housing in the Last Year: No     Number of Times Moved in the Last Year: 0     Homeless in the Last Year: No         EXAMINATION     Physical Exam:   Vitals: /72 (BP Location: Right arm, Patient Position: Sitting, BP Cuff Size: Adult)   Pulse 60   Temp 36.5 °C (97.7 °F) (Temporal)   Resp 12   Ht 1.727 m (5' 8\")   Wt 85.9 kg (189 lb 6 oz)   SpO2 97%       Constitutional:   Body Habitus: Body mass index is 28.79 kg/m².  Cooperation: Fully cooperates with exam  Appearance: Well-groomed, well-nourished  Respiratory:  Breathing comfortable on room air, no audible wheezing  Cardiovascular: Skin appears well-perfused  Psychiatric: Appropriate affect  Gait: normal gait, no use of ambulatory device, nonantalgic.     MSK:?Pain with right hip internal and external rotation      MEDICAL DECISION MAKING    DATA    Labs:   Lab Results   Component Value Date/Time    SODIUM 137 05/06/2025 05:02 AM    POTASSIUM 4.3 05/06/2025 05:02 AM    CHLORIDE 104 05/06/2025 05:02 AM    CO2 24 05/06/2025 05:02 AM    GLUCOSE 80 05/06/2025 05:02 AM    BUN 17 05/06/2025 05:02 AM    CREATININE 1.01 05/06/2025 05:02 AM    "     Lab Results   Component Value Date/Time    WBC 5.1 05/06/2025 05:02 AM    RBC 4.68 (L) 05/06/2025 05:02 AM    HEMOGLOBIN 14.6 05/06/2025 05:02 AM    HEMATOCRIT 42.6 05/06/2025 05:02 AM    MCV 91.0 05/06/2025 05:02 AM    MCH 31.2 05/06/2025 05:02 AM    MCHC 34.3 05/06/2025 05:02 AM    MPV 11.1 05/06/2025 05:02 AM    NEUTSPOLYS 59.10 05/05/2025 06:50 PM    LYMPHOCYTES 27.20 05/05/2025 06:50 PM    MONOCYTES 7.60 05/05/2025 06:50 PM    EOSINOPHILS 4.30 05/05/2025 06:50 PM    BASOPHILS 1.50 05/05/2025 06:50 PM        Lab Results   Component Value Date/Time    HBA1C 5.4 05/17/2023 11:56 AM          Imaging:   Prior personal imaging review:  X-Ray Bilateral Hips 8/18/23:  No evidence of fracture, moderate right and mild left hip joint space narrowing consistent with osteoarthritis.        IMAGING radiology reads: I reviewed the following radiology reports  X-Ray Bilateral Hips 8/18/23:  FINDINGS:  Mineralization is unremarkable for age.  No fracture.  There is moderate loss of RIGHT hip joint space with subchondral sclerosis and marginal osteophyte formation.  There is mild loss of LEFT hip joint space.  No bony erosion.     IMPRESSION:     RIGHT worse than LEFT hip osteoarthritis      DIAGNOSIS   Visit Diagnoses     ICD-10-CM   1. Right hip pain  M25.551   2. Primary osteoarthritis of right hip  M16.11   3. Chronic right-sided low back pain with right-sided sciatica  M54.41    G89.29         ASSESSMENT and PLAN:     Darien Martinez is a 55 y.o. male who returns to clinic for follow-up of right hip, groin, and low back pain.    Darien was seen today for follow-up.    Diagnoses and all orders for this visit:    Right hip pain  -     cyclobenzaprine (FLEXERIL) 5 MG tablet; Take 1-2 Tablets by mouth 3 times a day as needed for Mild Pain, Moderate Pain or Muscle Spasms.    Primary osteoarthritis of right hip  -     cyclobenzaprine (FLEXERIL) 5 MG tablet; Take 1-2 Tablets by mouth 3 times a day as needed for Mild Pain,  Moderate Pain or Muscle Spasms.  -     Peripheral Joint Injection; Future    Chronic right-sided low back pain with right-sided sciatica  -     cyclobenzaprine (FLEXERIL) 5 MG tablet; Take 1-2 Tablets by mouth 3 times a day as needed for Mild Pain, Moderate Pain or Muscle Spasms.        Assessment & Plan  Right hip and groin pain  Right hip ostearthritis  Right low back pain with radiation into the right lower extremity  Patient presents for evaluation of right low back, buttock, and groin pain previously treated successfully with right hip intraarticular steroid injection, as well as pain and numbness radiation into the right lateral thigh and lower leg and ankle. The patient's symptoms suggest a component of pain may be coming from the back rather than the hip, given the radiation of pain into the lower leg. However, the presence of groin pain is more consistent with intraarticular hip pathology. It is plausible that there are multiple contributing factors to his discomfort. The numbness extending down the right leg may be indicative of L5 nerve involvement. He has been advised to continue with Tylenol for pain management. A prescription for cyclobenzaprine 5 mg has been provided, with instructions to take it before bedtime and to try 5 mg rather than 10 mg during the day to assess its effectiveness in managing pain while minimizing grogginess. He has been encouraged to persist with his massage therapy and has been provided with a set of home exercises for the hip and back, to be performed a few times weekly. A repeat injection under ultrasound guidance will be scheduled to target the intraarticular right hip component of his pain. If there is no improvement following the injection, an MRI of the back will be considered.      Follow up: For ultrasound-guided right hip intraarticular steroid injection    Thank you for allowing me to participate in the care of this patient. If you have any questions please not  hesitate to contact me.         Please note that this dictation was created using voice recognition software. I have made every reasonable attempt to correct obvious errors but there may be errors of grammar and content that I may have overlooked prior to finalization of this note.    Deana Fair MD  Interventional Spine and Sports Physiatry  Physical Medicine and Rehabilitation  [facility]         [1]   Past Medical History:  Diagnosis Date    Hyperlipidemia    [2]   Past Surgical History:  Procedure Laterality Date    TYMPANOPLASTY      TYMPANOTOMY     [3]   Outpatient Medications Marked as Taking for the 5/21/25 encounter (Office Visit) with Deana Fair M.D.   Medication Sig Dispense Refill    cyclobenzaprine (FLEXERIL) 5 MG tablet Take 1-2 Tablets by mouth 3 times a day as needed for Mild Pain, Moderate Pain or Muscle Spasms. 90 Tablet 1    omeprazole (PRILOSEC) 40 MG delayed-release capsule Take 1 Capsule by mouth 2 times a day. 120 Capsule 0    CALCIUM PO Take 2 Each by mouth every day. Gummies      VITAMIN D PO Take 1 Tablet by mouth every day.      rosuvastatin (CRESTOR) 5 MG Tab Take 1 Tablet by mouth every evening. 90 Tablet 3   [4]   Current Outpatient Medications on File Prior to Visit   Medication Sig Dispense Refill    omeprazole (PRILOSEC) 40 MG delayed-release capsule Take 1 Capsule by mouth 2 times a day. 120 Capsule 0    CALCIUM PO Take 2 Each by mouth every day. Gummies      VITAMIN D PO Take 1 Tablet by mouth every day.      rosuvastatin (CRESTOR) 5 MG Tab Take 1 Tablet by mouth every evening. 90 Tablet 3    NON SPECIFIED Take  by mouth 1 time a day as needed (constipation). 5 teaspoons of olive oil       No current facility-administered medications on file prior to visit.   [5] No Known Allergies

## 2025-05-21 ENCOUNTER — OFFICE VISIT (OUTPATIENT)
Dept: PHYSICAL MEDICINE AND REHAB | Facility: MEDICAL CENTER | Age: 55
End: 2025-05-21
Payer: COMMERCIAL

## 2025-05-21 VITALS
BODY MASS INDEX: 28.7 KG/M2 | TEMPERATURE: 97.7 F | SYSTOLIC BLOOD PRESSURE: 120 MMHG | DIASTOLIC BLOOD PRESSURE: 72 MMHG | WEIGHT: 189.38 LBS | HEIGHT: 68 IN | OXYGEN SATURATION: 97 % | RESPIRATION RATE: 12 BRPM | HEART RATE: 60 BPM

## 2025-05-21 DIAGNOSIS — M16.11 PRIMARY OSTEOARTHRITIS OF RIGHT HIP: ICD-10-CM

## 2025-05-21 DIAGNOSIS — M25.551 RIGHT HIP PAIN: Primary | ICD-10-CM

## 2025-05-21 DIAGNOSIS — M54.41 CHRONIC RIGHT-SIDED LOW BACK PAIN WITH RIGHT-SIDED SCIATICA: ICD-10-CM

## 2025-05-21 DIAGNOSIS — G89.29 CHRONIC RIGHT-SIDED LOW BACK PAIN WITH RIGHT-SIDED SCIATICA: ICD-10-CM

## 2025-05-21 PROCEDURE — 3074F SYST BP LT 130 MM HG: CPT | Performed by: STUDENT IN AN ORGANIZED HEALTH CARE EDUCATION/TRAINING PROGRAM

## 2025-05-21 PROCEDURE — 3078F DIAST BP <80 MM HG: CPT | Performed by: STUDENT IN AN ORGANIZED HEALTH CARE EDUCATION/TRAINING PROGRAM

## 2025-05-21 PROCEDURE — 99214 OFFICE O/P EST MOD 30 MIN: CPT | Performed by: STUDENT IN AN ORGANIZED HEALTH CARE EDUCATION/TRAINING PROGRAM

## 2025-05-21 RX ORDER — CYCLOBENZAPRINE HCL 5 MG
5-10 TABLET ORAL 3 TIMES DAILY PRN
Qty: 90 TABLET | Refills: 1 | Status: SHIPPED | OUTPATIENT
Start: 2025-05-21

## 2025-05-21 ASSESSMENT — FIBROSIS 4 INDEX: FIB4 SCORE: 1.24

## 2025-05-21 ASSESSMENT — PATIENT HEALTH QUESTIONNAIRE - PHQ9: CLINICAL INTERPRETATION OF PHQ2 SCORE: 0

## 2025-05-29 NOTE — PROCEDURES
Date of Service: 6/03/2025    Physician/s: Deana Fair MD    Pre-operative Diagnosis: right hip osteoarthritis    Post-operative Diagnosis: right  hip osteoarthritis    Procedure: right Hip injection ultrasound-guided    Description of procedure:    The risks, benefits, and alternatives of the procedure were reviewed and discussed with the patient.  Written informed consent was freely obtained. A pre-procedural time-out was conducted by the physician verifying patient’s identity, procedure to be performed, procedure site and side, and allergy verification. Appropriate equipment was determined to be in place for the procedure.     No sedation was used for this procedure.     A chaperone was present during the entire procedure    In the office suite the patient was placed in a supine position with his  right hip exposed, and the skin was prepped and draped in the usual sterile fashion. The ultrasound probe was placed over the right  hip joint and the femoral head and neck junction was located and the target for injection was marked. The skin and tract was numbed with approximately 3 mL of 1% lidocaine using a 27g 1.5 inch needle. A 25g 3.5 inch needle was placed into skin and advanced under ultrasound guidance into the joint space. Following negative aspiration, approx 5mL of 1% lidocaine with 1mL of 4mg/mL dexamethasone was then injected into the joint, and the needle was subsequently removed intact after restyleted. The patient's hip was wiped with a 4x4 gauze, the area was cleansed with alcohol prep, and a bandaid was applied. There were no complications noted.     Preprocedural pain: ***/10  Postprocedural pain: ***/10    Deana Fair MD  Physical Medicine and Rehabilitation  Interventional Spine and Sports Physiatry  [facility]

## 2025-06-03 ENCOUNTER — APPOINTMENT (OUTPATIENT)
Dept: PHYSICAL MEDICINE AND REHAB | Facility: MEDICAL CENTER | Age: 55
End: 2025-06-03
Attending: STUDENT IN AN ORGANIZED HEALTH CARE EDUCATION/TRAINING PROGRAM
Payer: COMMERCIAL

## 2025-06-03 VITALS
OXYGEN SATURATION: 97 % | HEART RATE: 77 BPM | SYSTOLIC BLOOD PRESSURE: 118 MMHG | HEIGHT: 68 IN | DIASTOLIC BLOOD PRESSURE: 68 MMHG | BODY MASS INDEX: 28.23 KG/M2 | TEMPERATURE: 98.7 F | RESPIRATION RATE: 16 BRPM | WEIGHT: 186.29 LBS

## 2025-06-03 DIAGNOSIS — M16.11 PRIMARY OSTEOARTHRITIS OF RIGHT HIP: Primary | ICD-10-CM

## 2025-06-03 PROCEDURE — 3078F DIAST BP <80 MM HG: CPT | Performed by: STUDENT IN AN ORGANIZED HEALTH CARE EDUCATION/TRAINING PROGRAM

## 2025-06-03 PROCEDURE — 3074F SYST BP LT 130 MM HG: CPT | Performed by: STUDENT IN AN ORGANIZED HEALTH CARE EDUCATION/TRAINING PROGRAM

## 2025-06-03 PROCEDURE — 20611 DRAIN/INJ JOINT/BURSA W/US: CPT | Mod: RT | Performed by: STUDENT IN AN ORGANIZED HEALTH CARE EDUCATION/TRAINING PROGRAM

## 2025-06-03 RX ORDER — DEXAMETHASONE SODIUM PHOSPHATE 4 MG/ML
4 INJECTION, SOLUTION INTRA-ARTICULAR; INTRALESIONAL; INTRAMUSCULAR; INTRAVENOUS; SOFT TISSUE ONCE
Status: COMPLETED | OUTPATIENT
Start: 2025-06-03 | End: 2025-06-03

## 2025-06-03 RX ADMIN — DEXAMETHASONE SODIUM PHOSPHATE 4 MG: 4 INJECTION, SOLUTION INTRA-ARTICULAR; INTRALESIONAL; INTRAMUSCULAR; INTRAVENOUS; SOFT TISSUE at 11:16

## 2025-06-03 RX ADMIN — Medication 6 ML: at 11:16

## 2025-06-03 ASSESSMENT — PATIENT HEALTH QUESTIONNAIRE - PHQ9: CLINICAL INTERPRETATION OF PHQ2 SCORE: 0

## 2025-06-03 ASSESSMENT — FIBROSIS 4 INDEX: FIB4 SCORE: 1.24

## 2025-07-15 DIAGNOSIS — M54.41 CHRONIC RIGHT-SIDED LOW BACK PAIN WITH RIGHT-SIDED SCIATICA: ICD-10-CM

## 2025-07-15 DIAGNOSIS — M25.551 RIGHT HIP PAIN: ICD-10-CM

## 2025-07-15 DIAGNOSIS — M16.11 PRIMARY OSTEOARTHRITIS OF RIGHT HIP: ICD-10-CM

## 2025-07-15 DIAGNOSIS — G89.29 CHRONIC RIGHT-SIDED LOW BACK PAIN WITH RIGHT-SIDED SCIATICA: ICD-10-CM

## 2025-07-15 RX ORDER — CYCLOBENZAPRINE HCL 5 MG
5-10 TABLET ORAL 3 TIMES DAILY PRN
Qty: 90 TABLET | Refills: 0 | Status: SHIPPED | OUTPATIENT
Start: 2025-07-15

## 2025-07-15 NOTE — TELEPHONE ENCOUNTER
cyclobenzaprine (FLEXERIL) 5 MG tablet    Received request via: Pharmacy    Was the patient seen in the last year in this department? Yes    Does the patient have an active prescription (recently filled or refills available) for medication(s) requested? Yes    Pharmacy Name: [id number]    Does the patient have [facility] and need 100-day supply? (This applies to ALL medications) Patient does not have SCP

## 2025-07-18 ENCOUNTER — HOSPITAL ENCOUNTER (OUTPATIENT)
Dept: LAB | Facility: MEDICAL CENTER | Age: 55
End: 2025-07-18
Attending: INTERNAL MEDICINE
Payer: COMMERCIAL

## 2025-07-18 LAB
ALBUMIN SERPL BCP-MCNC: 4.6 G/DL (ref 3.2–4.9)
ALBUMIN/GLOB SERPL: 1.8 G/DL
ALP SERPL-CCNC: 60 U/L (ref 30–99)
ALT SERPL-CCNC: 40 U/L (ref 2–50)
ANION GAP SERPL CALC-SCNC: 11 MMOL/L (ref 7–16)
AST SERPL-CCNC: 29 U/L (ref 12–45)
BASOPHILS # BLD AUTO: 1 % (ref 0–1.8)
BASOPHILS # BLD: 0.04 K/UL (ref 0–0.12)
BILIRUB SERPL-MCNC: 0.5 MG/DL (ref 0.1–1.5)
BUN SERPL-MCNC: 17 MG/DL (ref 8–22)
CALCIUM ALBUM COR SERPL-MCNC: 9.3 MG/DL (ref 8.5–10.5)
CALCIUM SERPL-MCNC: 9.8 MG/DL (ref 8.5–10.5)
CHLORIDE SERPL-SCNC: 104 MMOL/L (ref 96–112)
CO2 SERPL-SCNC: 23 MMOL/L (ref 20–33)
CREAT SERPL-MCNC: 1.04 MG/DL (ref 0.5–1.4)
EOSINOPHIL # BLD AUTO: 0.22 K/UL (ref 0–0.51)
EOSINOPHIL NFR BLD: 5.3 % (ref 0–6.9)
ERYTHROCYTE [DISTWIDTH] IN BLOOD BY AUTOMATED COUNT: 46.2 FL (ref 35.9–50)
EST. AVERAGE GLUCOSE BLD GHB EST-MCNC: 117 MG/DL
FERRITIN SERPL-MCNC: 438 NG/ML (ref 22–322)
GFR SERPLBLD CREATININE-BSD FMLA CKD-EPI: 85 ML/MIN/1.73 M 2
GGT SERPL-CCNC: 32 U/L (ref 7–51)
GLOBULIN SER CALC-MCNC: 2.5 G/DL (ref 1.9–3.5)
GLUCOSE SERPL-MCNC: 96 MG/DL (ref 65–99)
HBA1C MFR BLD: 5.7 % (ref 4–5.6)
HBV CORE AB SERPL QL IA: NONREACTIVE
HBV SURFACE AB SERPL IA-ACNC: <3.5 MIU/ML (ref 0–10)
HBV SURFACE AG SER QL: NORMAL
HCT VFR BLD AUTO: 49.1 % (ref 42–52)
HCV AB SER QL: NORMAL
HGB BLD-MCNC: 16.5 G/DL (ref 14–18)
IMM GRANULOCYTES # BLD AUTO: 0 K/UL (ref 0–0.11)
IMM GRANULOCYTES NFR BLD AUTO: 0 % (ref 0–0.9)
INR PPP: 1.03 (ref 0.87–1.13)
IRON SATN MFR SERPL: 45 % (ref 15–55)
IRON SERPL-MCNC: 121 UG/DL (ref 50–180)
LYMPHOCYTES # BLD AUTO: 1.52 K/UL (ref 1–4.8)
LYMPHOCYTES NFR BLD: 36.5 % (ref 22–41)
MCH RBC QN AUTO: 30.8 PG (ref 27–33)
MCHC RBC AUTO-ENTMCNC: 33.6 G/DL (ref 32.3–36.5)
MCV RBC AUTO: 91.8 FL (ref 81.4–97.8)
MONOCYTES # BLD AUTO: 0.37 K/UL (ref 0–0.85)
MONOCYTES NFR BLD AUTO: 8.9 % (ref 0–13.4)
NEUTROPHILS # BLD AUTO: 2.01 K/UL (ref 1.82–7.42)
NEUTROPHILS NFR BLD: 48.3 % (ref 44–72)
NRBC # BLD AUTO: 0 K/UL
NRBC BLD-RTO: 0 /100 WBC (ref 0–0.2)
PLATELET # BLD AUTO: 206 K/UL (ref 164–446)
PMV BLD AUTO: 11.8 FL (ref 9–12.9)
POTASSIUM SERPL-SCNC: 4.5 MMOL/L (ref 3.6–5.5)
PROT SERPL-MCNC: 7.1 G/DL (ref 6–8.2)
PROTHROMBIN TIME: 13.5 SEC (ref 12–14.6)
RBC # BLD AUTO: 5.35 M/UL (ref 4.7–6.1)
SODIUM SERPL-SCNC: 138 MMOL/L (ref 135–145)
T4 FREE SERPL-MCNC: 1.25 NG/DL (ref 0.93–1.7)
TIBC SERPL-MCNC: 271 UG/DL (ref 250–450)
TSH SERPL-ACNC: 2.44 UIU/ML (ref 0.38–5.33)
UIBC SERPL-MCNC: 150 UG/DL (ref 110–370)
WBC # BLD AUTO: 4.2 K/UL (ref 4.8–10.8)

## 2025-07-18 PROCEDURE — 83036 HEMOGLOBIN GLYCOSYLATED A1C: CPT

## 2025-07-18 PROCEDURE — 84460 ALANINE AMINO (ALT) (SGPT): CPT

## 2025-07-18 PROCEDURE — 86381 MITOCHONDRIAL ANTIBODY EACH: CPT

## 2025-07-18 PROCEDURE — 82247 BILIRUBIN TOTAL: CPT

## 2025-07-18 PROCEDURE — 86015 ACTIN ANTIBODY EACH: CPT

## 2025-07-18 PROCEDURE — 84478 ASSAY OF TRIGLYCERIDES: CPT

## 2025-07-18 PROCEDURE — 83540 ASSAY OF IRON: CPT

## 2025-07-18 PROCEDURE — 85025 COMPLETE CBC W/AUTO DIFF WBC: CPT

## 2025-07-18 PROCEDURE — 82103 ALPHA-1-ANTITRYPSIN TOTAL: CPT

## 2025-07-18 PROCEDURE — 87340 HEPATITIS B SURFACE AG IA: CPT

## 2025-07-18 PROCEDURE — 83550 IRON BINDING TEST: CPT

## 2025-07-18 PROCEDURE — 86038 ANTINUCLEAR ANTIBODIES: CPT

## 2025-07-18 PROCEDURE — 84439 ASSAY OF FREE THYROXINE: CPT

## 2025-07-18 PROCEDURE — 82977 ASSAY OF GGT: CPT

## 2025-07-18 PROCEDURE — 36415 COLL VENOUS BLD VENIPUNCTURE: CPT

## 2025-07-18 PROCEDURE — 86706 HEP B SURFACE ANTIBODY: CPT

## 2025-07-18 PROCEDURE — 84443 ASSAY THYROID STIM HORMONE: CPT

## 2025-07-18 PROCEDURE — 86704 HEP B CORE ANTIBODY TOTAL: CPT

## 2025-07-18 PROCEDURE — 83010 ASSAY OF HAPTOGLOBIN QUANT: CPT

## 2025-07-18 PROCEDURE — 86708 HEPATITIS A ANTIBODY: CPT

## 2025-07-18 PROCEDURE — 84450 TRANSFERASE (AST) (SGOT): CPT

## 2025-07-18 PROCEDURE — 82465 ASSAY BLD/SERUM CHOLESTEROL: CPT

## 2025-07-18 PROCEDURE — 83883 ASSAY NEPHELOMETRY NOT SPEC: CPT

## 2025-07-18 PROCEDURE — 80053 COMPREHEN METABOLIC PANEL: CPT

## 2025-07-18 PROCEDURE — 85610 PROTHROMBIN TIME: CPT

## 2025-07-18 PROCEDURE — 82947 ASSAY GLUCOSE BLOOD QUANT: CPT

## 2025-07-18 PROCEDURE — 82728 ASSAY OF FERRITIN: CPT

## 2025-07-18 PROCEDURE — 86803 HEPATITIS C AB TEST: CPT

## 2025-07-18 PROCEDURE — 82172 ASSAY OF APOLIPOPROTEIN: CPT

## 2025-07-18 PROCEDURE — 82390 ASSAY OF CERULOPLASMIN: CPT

## 2025-07-19 LAB — HAV AB SER QL IA: NEGATIVE

## 2025-07-20 LAB
A1AT SERPL-MCNC: 134 MG/DL (ref 90–200)
MITOCHONDRIA M2 IGG SER-ACNC: 5.2 UNITS (ref 0–24.9)
NUCLEAR IGG SER QL IA: NORMAL
SMA IGG SER-ACNC: 5 UNITS (ref 0–19)

## 2025-07-22 LAB
A2 MACROGLOB SERPL-MCNC: 129 MG/DL (ref 110–276)
ALT SERPL W P-5'-P-CCNC: 40 IU/L (ref 0–55)
APO A-I SERPL-MCNC: 129 MG/DL (ref 101–178)
AST SERPL W P-5'-P-CCNC: 26 IU/L (ref 0–40)
BILIRUB SERPL-MCNC: 0.5 MG/DL (ref 0–1.2)
CERULOPLASMIN SERPL-MCNC: 19 MG/DL (ref 15–30)
CHOLEST SERPL-MCNC: 177 MG/DL (ref 100–199)
FIBROSIS SCORING 550107: ABNORMAL
FIBROSIS STAGE SERPL QL: ABNORMAL
GGT SERPL-CCNC: 30 IU/L (ref 0–65)
GLUCOSE SERPL-MCNC: 100 MG/DL (ref 70–99)
HAPTOGLOB SERPL-MCNC: 161 MG/DL (ref 29–370)
LABORATORY COMMENT REPORT: ABNORMAL
LIVER FIBR SCORE SERPL CALC.FIBROSURE: 0.13 (ref 0–0.21)
LIVER STEATOSIS GRADE SERPL QL: ABNORMAL
LIVER STEATOSIS SCORE SERPL: 0.61 (ref 0–0.4)
NASH GRADE SERPL QL: ABNORMAL
NASH INTERPRETATION SERPL-IMP: ABNORMAL
NASH SCORE SERPL: 0.5 (ref 0–0.25)
NASH SCORING Z4789: ABNORMAL
STEATOSIS SCORING NL11718: ABNORMAL
TEST PERFORMANCE INFO SPEC: ABNORMAL
TEST PERFORMANCE INFO SPEC: ABNORMAL
TRIGL SERPL-MCNC: 153 MG/DL (ref 0–149)

## 2025-08-06 DIAGNOSIS — M25.551 RIGHT HIP PAIN: Primary | ICD-10-CM

## 2025-08-06 DIAGNOSIS — M16.11 PRIMARY OSTEOARTHRITIS OF RIGHT HIP: ICD-10-CM

## 2025-08-21 DIAGNOSIS — G89.29 CHRONIC RIGHT-SIDED LOW BACK PAIN WITH RIGHT-SIDED SCIATICA: ICD-10-CM

## 2025-08-21 DIAGNOSIS — M54.41 CHRONIC RIGHT-SIDED LOW BACK PAIN WITH RIGHT-SIDED SCIATICA: ICD-10-CM

## 2025-08-21 DIAGNOSIS — M16.11 PRIMARY OSTEOARTHRITIS OF RIGHT HIP: ICD-10-CM

## 2025-08-21 DIAGNOSIS — M25.551 RIGHT HIP PAIN: ICD-10-CM

## 2025-08-21 RX ORDER — CYCLOBENZAPRINE HCL 5 MG
5-10 TABLET ORAL 3 TIMES DAILY PRN
Qty: 90 TABLET | Refills: 0 | Status: SHIPPED | OUTPATIENT
Start: 2025-08-21

## 2025-09-03 ENCOUNTER — APPOINTMENT (OUTPATIENT)
Dept: PHYSICAL MEDICINE AND REHAB | Facility: MEDICAL CENTER | Age: 55
End: 2025-09-03
Payer: COMMERCIAL